# Patient Record
Sex: FEMALE | Race: WHITE | NOT HISPANIC OR LATINO | Employment: OTHER | URBAN - METROPOLITAN AREA
[De-identification: names, ages, dates, MRNs, and addresses within clinical notes are randomized per-mention and may not be internally consistent; named-entity substitution may affect disease eponyms.]

---

## 2021-11-18 ENCOUNTER — OFFICE VISIT (OUTPATIENT)
Dept: URGENT CARE | Facility: PHYSICIAN GROUP | Age: 71
End: 2021-11-18
Payer: COMMERCIAL

## 2021-11-18 VITALS
TEMPERATURE: 97.9 F | BODY MASS INDEX: 28.93 KG/M2 | HEART RATE: 116 BPM | DIASTOLIC BLOOD PRESSURE: 72 MMHG | RESPIRATION RATE: 20 BRPM | HEIGHT: 66 IN | WEIGHT: 180 LBS | SYSTOLIC BLOOD PRESSURE: 142 MMHG | OXYGEN SATURATION: 97 %

## 2021-11-18 DIAGNOSIS — T78.40XA ALLERGIC REACTION, INITIAL ENCOUNTER: ICD-10-CM

## 2021-11-18 DIAGNOSIS — R11.0 NAUSEA: ICD-10-CM

## 2021-11-18 PROCEDURE — 99203 OFFICE O/P NEW LOW 30 MIN: CPT | Performed by: NURSE PRACTITIONER

## 2021-11-18 RX ORDER — PREDNISONE 20 MG/1
TABLET ORAL
Qty: 6 TABLET | Refills: 0 | Status: SHIPPED | OUTPATIENT
Start: 2021-11-18

## 2021-11-18 RX ORDER — DEXAMETHASONE SODIUM PHOSPHATE 10 MG/ML
10 INJECTION INTRAMUSCULAR; INTRAVENOUS ONCE
Status: COMPLETED | OUTPATIENT
Start: 2021-11-18 | End: 2021-11-18

## 2021-11-18 RX ORDER — ONDANSETRON 4 MG/1
4 TABLET, ORALLY DISINTEGRATING ORAL EVERY 8 HOURS PRN
Qty: 20 TABLET | Refills: 0 | Status: SHIPPED | OUTPATIENT
Start: 2021-11-18

## 2021-11-18 RX ORDER — ONDANSETRON 4 MG/1
4 TABLET, ORALLY DISINTEGRATING ORAL ONCE
Status: COMPLETED | OUTPATIENT
Start: 2021-11-18 | End: 2021-11-18

## 2021-11-18 RX ADMIN — DEXAMETHASONE SODIUM PHOSPHATE 10 MG: 10 INJECTION INTRAMUSCULAR; INTRAVENOUS at 16:41

## 2021-11-18 RX ADMIN — ONDANSETRON 4 MG: 4 TABLET, ORALLY DISINTEGRATING ORAL at 16:41

## 2021-11-18 ASSESSMENT — ENCOUNTER SYMPTOMS
COUGH: 0
CONSTIPATION: 0
EYE REDNESS: 0
VOMITING: 0
ABDOMINAL PAIN: 0
FEVER: 0
HEADACHES: 0
SHORTNESS OF BREATH: 0
SORE THROAT: 0
NAUSEA: 1
WHEEZING: 0
DIZZINESS: 0

## 2021-11-18 ASSESSMENT — LIFESTYLE VARIABLES: SUBSTANCE_ABUSE: 0

## 2021-11-19 NOTE — PROGRESS NOTES
Migdalia Aguilar is a 70 y.o. female who presents for Rash (full body rash; Pt noticed it- 1x day ), Diarrhea (2 episodes last 24 hr), and Emesis (3 epsiodes last 24 hr)      HPI this new problem.  Migdalia is a 70-year-old female who is brought into urgent care by her daughter-in-law for complaints of rash all over her body.  Is a very itchy rash.  Rash started 2 days ago.  It started on her thighs and then spread all over her body.  Yesterday caused her tongue to swell.  Her daughter gave her prednisone to take.  She took prednisone tablet last night and 1 again this morning.  That alleviated the tongue swelling but the rashes persisted.  She has also been taking some Benadryl to help with the itch.  The Benadryl has helped her to sleep at night.  2 days ago she experienced 2 episodes of diarrhea as well as vomited 3 times.  She feels better right now.  She does have some mild nausea and is not hungry.  She denies shortness of breath, chest pain, back pain.  No other aggravating or alleviating factors.  She denies any use of any new medications, foods, lotions, shampoos, detergents.    Review of Systems   Constitutional: Negative for fever and malaise/fatigue.   HENT: Negative for sore throat.    Eyes: Negative for redness.   Respiratory: Negative for cough, shortness of breath and wheezing.    Cardiovascular: Negative for chest pain.   Gastrointestinal: Positive for nausea. Negative for abdominal pain, constipation and vomiting.   Genitourinary: Negative for dysuria and urgency.   Skin: Positive for itching and rash.   Neurological: Negative for dizziness and headaches.   Endo/Heme/Allergies: Negative for environmental allergies.   Psychiatric/Behavioral: Negative for substance abuse.       Allergies:       Allergies   Allergen Reactions   • Sulfa Drugs Unspecified     Pt was given to as a child; runs fever.        PMSFS Hx:  History reviewed. No pertinent past medical history.  History reviewed. No pertinent  "surgical history.  History reviewed. No pertinent family history.  Social History     Tobacco Use   • Smoking status: Not on file   • Smokeless tobacco: Not on file   Substance Use Topics   • Alcohol use: Not on file       Problems:   There is no problem list on file for this patient.      Medications:   No current outpatient medications on file prior to visit.     No current facility-administered medications on file prior to visit.          Objective:     /72 (BP Location: Left arm, Patient Position: Sitting, BP Cuff Size: Adult)   Pulse (!) 116   Temp 36.6 °C (97.9 °F) (Temporal)   Resp 20   Ht 1.676 m (5' 6\")   Wt 81.6 kg (180 lb)   SpO2 97%   BMI 29.05 kg/m²     Physical Exam  Vitals and nursing note reviewed.   Constitutional:       Appearance: She is well-developed.   HENT:      Head: Normocephalic.      Mouth/Throat:      Lips: Pink.      Mouth: Mucous membranes are moist.      Pharynx: Uvula midline. No pharyngeal swelling, posterior oropharyngeal erythema or uvula swelling.   Cardiovascular:      Rate and Rhythm: Normal rate and regular rhythm.      Pulses: Normal pulses.      Heart sounds: Normal heart sounds.   Pulmonary:      Effort: Pulmonary effort is normal. No accessory muscle usage.      Breath sounds: Normal breath sounds. No wheezing.   Musculoskeletal:      Cervical back: Full passive range of motion without pain, normal range of motion and neck supple.   Skin:     General: Skin is warm and dry.      Capillary Refill: Capillary refill takes less than 2 seconds.      Findings: Erythema (Diffuse all over body) and rash present.   Neurological:      General: No focal deficit present.      Mental Status: She is alert and oriented to person, place, and time.      Cranial Nerves: Cranial nerves are intact.      Sensory: Sensation is intact.      Motor: Motor function is intact.      Coordination: Coordination is intact.      Gait: Gait is intact.   Psychiatric:         Mood and Affect: " Mood normal.         Behavior: Behavior normal.         Thought Content: Thought content normal.     Decadron and Zofran given in urgent care tonight.    Assessment /Associated Orders:      1. Allergic reaction, initial encounter  dexamethasone (DECADRON) injection (check route below) 10 mg    predniSONE (DELTASONE) 20 MG Tab   2. Nausea  ondansetron (ZOFRAN ODT) dispertab 4 mg    ondansetron (ZOFRAN ODT) 4 MG TABLET DISPERSIBLE       Medical Decision Making:    Pt is clinically stable at today's acute urgent care visit.  No acute distress noted. Appropriate for outpatient management at this time.   Acute problem today with uncertain prognosis.   Educated in proper administration of medication(s) ordered today including safety, possible SE, risks, benefits, rationale and alternatives to therapy.   Start prednisone tomorrow morning  Okay to take 25 mg of Benadryl at night.  Educated in safety precautions while taking it.  It should be used sparingly.  Advised that it can make her sleepy and increased fall risk.  She verbalizes understanding.  Recommended over-the-counter Claritin, Allegra or Zyrtec to be taken during the day  Trial of over-the-counter Pepcid AC  Keep well-hydrated  Consider all sources of possible allergens.  Foods, chemicals, detergents, lotions, etc...        Advised to follow-up with the primary care provider for recheck, reevaluation, and consideration of further management if necessary.   Discussed management options (risks,benefits, and alternatives to treatment). Expressed understanding and the treatment plan was agreed upon. Questions were encouraged and answered   Return to urgent care prn if new or worsening sx or if there is no improvement in condition prn.    Educated in Red flags and indications to immediately call 911 or present to the Emergency Department.     I personally reviewed prior external notes and test results pertinent to today's visit.  I have independently reviewed and  interpreted all diagnostics ordered during this urgent care acute visit.   Time spent evaluating this patient was at least 30 minutes and includes preparing for visit, counseling/education, exam and evaluation, obtaining history, independent interpretation, ordering lab/test/procedures,medication management and documentation.Time does not include separately billable procedures noted .

## 2025-07-01 ENCOUNTER — APPOINTMENT (OUTPATIENT)
Dept: RADIOLOGY | Facility: MEDICAL CENTER | Age: 75
DRG: 020 | End: 2025-07-01
Attending: EMERGENCY MEDICINE
Payer: COMMERCIAL

## 2025-07-01 ENCOUNTER — HOSPITAL ENCOUNTER (OUTPATIENT)
Dept: RADIOLOGY | Facility: MEDICAL CENTER | Age: 75
End: 2025-07-01

## 2025-07-01 ENCOUNTER — HOSPITAL ENCOUNTER (INPATIENT)
Facility: MEDICAL CENTER | Age: 75
LOS: 3 days | DRG: 020 | End: 2025-07-04
Attending: EMERGENCY MEDICINE | Admitting: EMERGENCY MEDICINE
Payer: COMMERCIAL

## 2025-07-01 DIAGNOSIS — I60.9 SAH (SUBARACHNOID HEMORRHAGE) (HCC): ICD-10-CM

## 2025-07-01 DIAGNOSIS — I61.5 INTRAVENTRICULAR HEMORRHAGE (HCC): Primary | ICD-10-CM

## 2025-07-01 DIAGNOSIS — Z71.89 ADVANCED CARE PLANNING/COUNSELING DISCUSSION: ICD-10-CM

## 2025-07-01 PROBLEM — J96.00 ACUTE RESPIRATORY FAILURE (HCC): Status: ACTIVE | Noted: 2025-07-01

## 2025-07-01 PROBLEM — I46.9 CARDIAC ARREST (HCC): Status: ACTIVE | Noted: 2025-07-01

## 2025-07-01 PROBLEM — R79.89 ELEVATED LFTS: Status: ACTIVE | Noted: 2025-07-01

## 2025-07-01 PROBLEM — D72.829 LEUKOCYTOSIS: Status: ACTIVE | Noted: 2025-07-01

## 2025-07-01 LAB
ABO GROUP BLD: NORMAL
ALBUMIN SERPL BCP-MCNC: 3.3 G/DL (ref 3.2–4.9)
ALBUMIN/GLOB SERPL: 1.1 G/DL
ALP SERPL-CCNC: 82 U/L (ref 30–99)
ALT SERPL-CCNC: 310 U/L (ref 2–50)
ANION GAP SERPL CALC-SCNC: 15 MMOL/L (ref 7–16)
AST SERPL-CCNC: 305 U/L (ref 12–45)
BASE EXCESS BLDA CALC-SCNC: -6 MMOL/L (ref -4–3)
BASE EXCESS BLDV CALC-SCNC: -4 MMOL/L (ref -2–3)
BASOPHILS # BLD AUTO: 0 % (ref 0–1.8)
BASOPHILS # BLD: 0 K/UL (ref 0–0.12)
BILIRUB SERPL-MCNC: 0.4 MG/DL (ref 0.1–1.5)
BLD GP AB SCN SERPL QL: NORMAL
BODY TEMPERATURE: 36.2 CENTIGRADE
BODY TEMPERATURE: ABNORMAL DEGREES
BREATHS SETTING VENT: 22
BUN SERPL-MCNC: 32 MG/DL (ref 8–22)
BURR CELLS BLD QL SMEAR: NORMAL
CALCIUM ALBUM COR SERPL-MCNC: 9 MG/DL (ref 8.5–10.5)
CALCIUM SERPL-MCNC: 8.4 MG/DL (ref 8.5–10.5)
CHLORIDE SERPL-SCNC: 105 MMOL/L (ref 96–112)
CHOLEST SERPL-MCNC: 175 MG/DL (ref 100–199)
CO2 BLDA-SCNC: 21 MMOL/L (ref 32–48)
CO2 SERPL-SCNC: 18 MMOL/L (ref 20–33)
CREAT SERPL-MCNC: 0.89 MG/DL (ref 0.5–1.4)
DELSYS IDSYS: ABNORMAL
EOSINOPHIL # BLD AUTO: 0 K/UL (ref 0–0.51)
EOSINOPHIL NFR BLD: 0 % (ref 0–6.9)
ERYTHROCYTE [DISTWIDTH] IN BLOOD BY AUTOMATED COUNT: 47.5 FL (ref 35.9–50)
GFR SERPLBLD CREATININE-BSD FMLA CKD-EPI: 67 ML/MIN/1.73 M 2
GLOBULIN SER CALC-MCNC: 3.1 G/DL (ref 1.9–3.5)
GLUCOSE SERPL-MCNC: 189 MG/DL (ref 65–99)
HCO3 BLDA-SCNC: 20.1 MMOL/L (ref 21–28)
HCO3 BLDV-SCNC: 20 MMOL/L (ref 22–29)
HCT VFR BLD AUTO: 36.6 % (ref 37–47)
HDLC SERPL-MCNC: 53 MG/DL
HGB BLD-MCNC: 12.1 G/DL (ref 12–16)
HOROWITZ INDEX BLDA+IHG-RTO: 362 MM[HG]
LACTATE BLD-SCNC: 2.2 MMOL/L (ref 0.5–2)
LACTATE SERPL-SCNC: 2.3 MMOL/L (ref 0.5–2)
LDLC SERPL CALC-MCNC: 84 MG/DL
LYMPHOCYTES # BLD AUTO: 0.81 K/UL (ref 1–4.8)
LYMPHOCYTES NFR BLD: 3.4 % (ref 22–41)
MANUAL DIFF BLD: NORMAL
MCH RBC QN AUTO: 27.7 PG (ref 27–33)
MCHC RBC AUTO-ENTMCNC: 33.1 G/DL (ref 32.2–35.5)
MCV RBC AUTO: 83.8 FL (ref 81.4–97.8)
MODE IMODE: ABNORMAL
MONOCYTES # BLD AUTO: 2.3 K/UL (ref 0–0.85)
MONOCYTES NFR BLD AUTO: 9.5 % (ref 0–13.4)
MORPHOLOGY BLD-IMP: NORMAL
NEUTROPHILS # BLD AUTO: 20.64 K/UL (ref 1.82–7.42)
NEUTROPHILS NFR BLD: 87.1 % (ref 44–72)
NRBC # BLD AUTO: 0 K/UL
NRBC BLD-RTO: 0 /100 WBC (ref 0–0.2)
NT-PROBNP SERPL IA-MCNC: 1697 PG/ML (ref 0–125)
O2/TOTAL GAS SETTING VFR VENT: 50 %
PCO2 BLDA: 40.8 MMHG (ref 32–48)
PCO2 BLDV: 32.4 MMHG (ref 38–54)
PEEP END EXPIRATORY PRESSURE IPEEP: 8 CMH20
PH BLDA: 7.3 [PH] (ref 7.35–7.45)
PH BLDV: 7.39 [PH] (ref 7.31–7.45)
PLATELET # BLD AUTO: 330 K/UL (ref 164–446)
PLATELET BLD QL SMEAR: NORMAL
PMV BLD AUTO: 10.2 FL (ref 9–12.9)
PO2 BLDA: 181 MMHG (ref 83–108)
PO2 BLDV: 56.2 MMHG (ref 23–48)
POIKILOCYTOSIS BLD QL SMEAR: NORMAL
POTASSIUM SERPL-SCNC: 3.3 MMOL/L (ref 3.6–5.5)
PROT SERPL-MCNC: 6.4 G/DL (ref 6–8.2)
RBC # BLD AUTO: 4.37 M/UL (ref 4.2–5.4)
RBC BLD AUTO: PRESENT
RH BLD: NORMAL
SAO2 % BLDA: 99 % (ref 93–99)
SAO2 % BLDV: 82 % (ref 60–85)
SODIUM SERPL-SCNC: 138 MMOL/L (ref 135–145)
SPECIMEN DRAWN FROM PATIENT: ABNORMAL
TIDAL VOLUME IVT: 340 ML
TRIGL SERPL-MCNC: 189 MG/DL (ref 0–149)
TROPONIN T SERPL-MCNC: 139 NG/L (ref 6–19)
WBC # BLD AUTO: 23.7 K/UL (ref 4.8–10.8)

## 2025-07-01 PROCEDURE — 70496 CT ANGIOGRAPHY HEAD: CPT

## 2025-07-01 PROCEDURE — 96365 THER/PROPH/DIAG IV INF INIT: CPT

## 2025-07-01 PROCEDURE — 82803 BLOOD GASES ANY COMBINATION: CPT | Mod: 91

## 2025-07-01 PROCEDURE — 96368 THER/DIAG CONCURRENT INF: CPT

## 2025-07-01 PROCEDURE — 770022 HCHG ROOM/CARE - ICU (200)

## 2025-07-01 PROCEDURE — 61107 TDH PNXR IMPLT VENTR CATH: CPT

## 2025-07-01 PROCEDURE — 85007 BL SMEAR W/DIFF WBC COUNT: CPT

## 2025-07-01 PROCEDURE — 70498 CT ANGIOGRAPHY NECK: CPT

## 2025-07-01 PROCEDURE — 93005 ELECTROCARDIOGRAM TRACING: CPT | Mod: TC | Performed by: EMERGENCY MEDICINE

## 2025-07-01 PROCEDURE — 86850 RBC ANTIBODY SCREEN: CPT

## 2025-07-01 PROCEDURE — 99291 CRITICAL CARE FIRST HOUR: CPT

## 2025-07-01 PROCEDURE — 99292 CRITICAL CARE ADDL 30 MIN: CPT | Performed by: EMERGENCY MEDICINE

## 2025-07-01 PROCEDURE — 700101 HCHG RX REV CODE 250: Performed by: EMERGENCY MEDICINE

## 2025-07-01 PROCEDURE — 700105 HCHG RX REV CODE 258: Performed by: EMERGENCY MEDICINE

## 2025-07-01 PROCEDURE — 700111 HCHG RX REV CODE 636 W/ 250 OVERRIDE (IP): Mod: JZ | Performed by: NEUROLOGICAL SURGERY

## 2025-07-01 PROCEDURE — 36415 COLL VENOUS BLD VENIPUNCTURE: CPT

## 2025-07-01 PROCEDURE — 85384 FIBRINOGEN ACTIVITY: CPT

## 2025-07-01 PROCEDURE — 96376 TX/PRO/DX INJ SAME DRUG ADON: CPT

## 2025-07-01 PROCEDURE — 86901 BLOOD TYPING SEROLOGIC RH(D): CPT

## 2025-07-01 PROCEDURE — 96366 THER/PROPH/DIAG IV INF ADDON: CPT

## 2025-07-01 PROCEDURE — 99222 1ST HOSP IP/OBS MODERATE 55: CPT | Performed by: PSYCHIATRY & NEUROLOGY

## 2025-07-01 PROCEDURE — 36600 WITHDRAWAL OF ARTERIAL BLOOD: CPT

## 2025-07-01 PROCEDURE — 85027 COMPLETE CBC AUTOMATED: CPT

## 2025-07-01 PROCEDURE — 83605 ASSAY OF LACTIC ACID: CPT

## 2025-07-01 PROCEDURE — 85347 COAGULATION TIME ACTIVATED: CPT

## 2025-07-01 PROCEDURE — 83880 ASSAY OF NATRIURETIC PEPTIDE: CPT

## 2025-07-01 PROCEDURE — 700117 HCHG RX CONTRAST REV CODE 255: Performed by: EMERGENCY MEDICINE

## 2025-07-01 PROCEDURE — 009630Z DRAINAGE OF CEREBRAL VENTRICLE WITH DRAINAGE DEVICE, PERCUTANEOUS APPROACH: ICD-10-PCS | Performed by: NEUROLOGICAL SURGERY

## 2025-07-01 PROCEDURE — 86900 BLOOD TYPING SEROLOGIC ABO: CPT

## 2025-07-01 PROCEDURE — 84484 ASSAY OF TROPONIN QUANT: CPT

## 2025-07-01 PROCEDURE — 99291 CRITICAL CARE FIRST HOUR: CPT | Performed by: EMERGENCY MEDICINE

## 2025-07-01 PROCEDURE — 700111 HCHG RX REV CODE 636 W/ 250 OVERRIDE (IP): Mod: JZ

## 2025-07-01 PROCEDURE — 80061 LIPID PANEL: CPT

## 2025-07-01 PROCEDURE — 71045 X-RAY EXAM CHEST 1 VIEW: CPT

## 2025-07-01 PROCEDURE — 85576 BLOOD PLATELET AGGREGATION: CPT | Mod: 91

## 2025-07-01 PROCEDURE — 80053 COMPREHEN METABOLIC PANEL: CPT

## 2025-07-01 PROCEDURE — 94002 VENT MGMT INPAT INIT DAY: CPT

## 2025-07-01 PROCEDURE — 96375 TX/PRO/DX INJ NEW DRUG ADDON: CPT

## 2025-07-01 RX ORDER — ONDANSETRON 2 MG/ML
4 INJECTION INTRAMUSCULAR; INTRAVENOUS EVERY 4 HOURS PRN
Status: DISCONTINUED | OUTPATIENT
Start: 2025-07-01 | End: 2025-07-02

## 2025-07-01 RX ORDER — ACETAMINOPHEN 325 MG/1
650 TABLET ORAL EVERY 6 HOURS PRN
Status: DISCONTINUED | OUTPATIENT
Start: 2025-07-01 | End: 2025-07-02

## 2025-07-01 RX ORDER — ACETAMINOPHEN 500 MG
1000 TABLET ORAL EVERY 6 HOURS
Status: DISCONTINUED | OUTPATIENT
Start: 2025-07-02 | End: 2025-07-02

## 2025-07-01 RX ORDER — NIMODIPINE 30 MG/1
60 CAPSULE, LIQUID FILLED ORAL EVERY 4 HOURS
Status: DISCONTINUED | OUTPATIENT
Start: 2025-07-01 | End: 2025-07-01

## 2025-07-01 RX ORDER — ACETAMINOPHEN 650 MG/1
1000 SUPPOSITORY RECTAL EVERY 6 HOURS
Status: DISCONTINUED | OUTPATIENT
Start: 2025-07-02 | End: 2025-07-02

## 2025-07-01 RX ORDER — LABETALOL HYDROCHLORIDE 5 MG/ML
20 INJECTION, SOLUTION INTRAVENOUS ONCE
Status: DISCONTINUED | OUTPATIENT
Start: 2025-07-01 | End: 2025-07-02

## 2025-07-01 RX ORDER — AMOXICILLIN 250 MG
2 CAPSULE ORAL EVERY EVENING
Status: DISCONTINUED | OUTPATIENT
Start: 2025-07-02 | End: 2025-07-02

## 2025-07-01 RX ORDER — POLYETHYLENE GLYCOL 3350 17 G/17G
1 POWDER, FOR SOLUTION ORAL
Status: DISCONTINUED | OUTPATIENT
Start: 2025-07-01 | End: 2025-07-02

## 2025-07-01 RX ORDER — SODIUM CHLORIDE 9 MG/ML
INJECTION, SOLUTION INTRAVENOUS CONTINUOUS
Status: DISCONTINUED | OUTPATIENT
Start: 2025-07-01 | End: 2025-07-02

## 2025-07-01 RX ORDER — HYDROMORPHONE HYDROCHLORIDE 1 MG/ML
0.5 INJECTION, SOLUTION INTRAMUSCULAR; INTRAVENOUS; SUBCUTANEOUS
Status: DISCONTINUED | OUTPATIENT
Start: 2025-07-01 | End: 2025-07-02

## 2025-07-01 RX ORDER — IPRATROPIUM BROMIDE AND ALBUTEROL SULFATE 2.5; .5 MG/3ML; MG/3ML
3 SOLUTION RESPIRATORY (INHALATION)
Status: DISCONTINUED | OUTPATIENT
Start: 2025-07-01 | End: 2025-07-02

## 2025-07-01 RX ORDER — HYDROMORPHONE HYDROCHLORIDE 1 MG/ML
1 INJECTION, SOLUTION INTRAMUSCULAR; INTRAVENOUS; SUBCUTANEOUS
Status: DISCONTINUED | OUTPATIENT
Start: 2025-07-01 | End: 2025-07-02

## 2025-07-01 RX ORDER — FAMOTIDINE 20 MG/1
20 TABLET, FILM COATED ORAL EVERY 12 HOURS
Status: DISCONTINUED | OUTPATIENT
Start: 2025-07-01 | End: 2025-07-02

## 2025-07-01 RX ORDER — ONDANSETRON 4 MG/1
4 TABLET, ORALLY DISINTEGRATING ORAL EVERY 4 HOURS PRN
Status: DISCONTINUED | OUTPATIENT
Start: 2025-07-01 | End: 2025-07-02

## 2025-07-01 RX ADMIN — FENTANYL CITRATE 50 MCG: 50 INJECTION, SOLUTION INTRAMUSCULAR; INTRAVENOUS at 22:32

## 2025-07-01 RX ADMIN — IOHEXOL 80 ML: 350 INJECTION, SOLUTION INTRAVENOUS at 23:10

## 2025-07-01 RX ADMIN — FENTANYL CITRATE 50 MCG: 50 INJECTION, SOLUTION INTRAMUSCULAR; INTRAVENOUS at 22:30

## 2025-07-01 RX ADMIN — PROPOFOL 5 MCG/KG/MIN: 10 INJECTION, EMULSION INTRAVENOUS at 23:05

## 2025-07-01 RX ADMIN — SODIUM CHLORIDE 10 MG/HR: 9 INJECTION, SOLUTION INTRAVENOUS at 22:58

## 2025-07-01 RX ADMIN — SODIUM CHLORIDE 10 MG/HR: 9 INJECTION, SOLUTION INTRAVENOUS at 22:22

## 2025-07-01 NOTE — Clinical Note
Closure device placed in the right femoral artery.   TERUMO  Angio-Seal VIP  Vascular Closure Device  REF [id number]  LOT [id number]  EXP 2026-02-27

## 2025-07-02 ENCOUNTER — APPOINTMENT (OUTPATIENT)
Dept: RADIOLOGY | Facility: MEDICAL CENTER | Age: 75
DRG: 020 | End: 2025-07-02
Attending: INTERNAL MEDICINE
Payer: COMMERCIAL

## 2025-07-02 ENCOUNTER — ANESTHESIA EVENT (OUTPATIENT)
Dept: RADIOLOGY | Facility: MEDICAL CENTER | Age: 75
DRG: 020 | End: 2025-07-02
Payer: COMMERCIAL

## 2025-07-02 ENCOUNTER — APPOINTMENT (OUTPATIENT)
Dept: CARDIOLOGY | Facility: MEDICAL CENTER | Age: 75
DRG: 020 | End: 2025-07-02
Attending: INTERNAL MEDICINE
Payer: COMMERCIAL

## 2025-07-02 ENCOUNTER — ANESTHESIA (OUTPATIENT)
Dept: RADIOLOGY | Facility: MEDICAL CENTER | Age: 75
DRG: 020 | End: 2025-07-02
Payer: COMMERCIAL

## 2025-07-02 ENCOUNTER — APPOINTMENT (OUTPATIENT)
Dept: RADIOLOGY | Facility: MEDICAL CENTER | Age: 75
DRG: 020 | End: 2025-07-02
Attending: NEUROLOGICAL SURGERY
Payer: COMMERCIAL

## 2025-07-02 ENCOUNTER — HOSPITAL ENCOUNTER (OUTPATIENT)
Dept: RADIOLOGY | Facility: MEDICAL CENTER | Age: 75
End: 2025-07-02

## 2025-07-02 ENCOUNTER — APPOINTMENT (OUTPATIENT)
Dept: RADIOLOGY | Facility: MEDICAL CENTER | Age: 75
DRG: 020 | End: 2025-07-02
Attending: EMERGENCY MEDICINE
Payer: COMMERCIAL

## 2025-07-02 VITALS
DIASTOLIC BLOOD PRESSURE: 58 MMHG | RESPIRATION RATE: 22 BRPM | HEIGHT: 66 IN | OXYGEN SATURATION: 98 % | WEIGHT: 180 LBS | TEMPERATURE: 97.2 F | SYSTOLIC BLOOD PRESSURE: 126 MMHG | BODY MASS INDEX: 28.93 KG/M2 | HEART RATE: 66 BPM

## 2025-07-02 PROBLEM — J96.01 ACUTE RESPIRATORY FAILURE WITH HYPOXIA (HCC): Status: ACTIVE | Noted: 2025-07-01

## 2025-07-02 PROBLEM — Z71.89 ADVANCED CARE PLANNING/COUNSELING DISCUSSION: Status: ACTIVE | Noted: 2025-07-02

## 2025-07-02 LAB
ABO + RH BLD: NORMAL
ALBUMIN SERPL BCP-MCNC: 3.3 G/DL (ref 3.2–4.9)
ALBUMIN SERPL BCP-MCNC: 3.4 G/DL (ref 3.2–4.9)
ALBUMIN/GLOB SERPL: 1 G/DL
ALBUMIN/GLOB SERPL: 1.2 G/DL
ALP SERPL-CCNC: 77 U/L (ref 30–99)
ALP SERPL-CCNC: 81 U/L (ref 30–99)
ALT SERPL-CCNC: 265 U/L (ref 2–50)
ALT SERPL-CCNC: 291 U/L (ref 2–50)
ANION GAP SERPL CALC-SCNC: 13 MMOL/L (ref 7–16)
ANION GAP SERPL CALC-SCNC: 15 MMOL/L (ref 7–16)
ANISOCYTOSIS BLD QL SMEAR: ABNORMAL
APTT PPP: 23.7 SEC (ref 24.7–36)
ARTERIAL PATENCY WRIST A: ABNORMAL
AST SERPL-CCNC: 191 U/L (ref 12–45)
AST SERPL-CCNC: 252 U/L (ref 12–45)
BASE EXCESS BLDA CALC-SCNC: -7 MMOL/L (ref -4–3)
BASOPHILS # BLD AUTO: 0 % (ref 0–1.8)
BASOPHILS # BLD AUTO: 0 % (ref 0–1.8)
BASOPHILS # BLD: 0 K/UL (ref 0–0.12)
BASOPHILS # BLD: 0 K/UL (ref 0–0.12)
BILIRUB SERPL-MCNC: 0.4 MG/DL (ref 0.1–1.5)
BILIRUB SERPL-MCNC: 0.4 MG/DL (ref 0.1–1.5)
BODY TEMPERATURE: ABNORMAL DEGREES
BREATHS SETTING VENT: 22
BUN SERPL-MCNC: 26 MG/DL (ref 8–22)
BUN SERPL-MCNC: 30 MG/DL (ref 8–22)
BURR CELLS BLD QL SMEAR: NORMAL
BURR CELLS BLD QL SMEAR: NORMAL
CALCIUM ALBUM COR SERPL-MCNC: 8.7 MG/DL (ref 8.5–10.5)
CALCIUM ALBUM COR SERPL-MCNC: 8.7 MG/DL (ref 8.5–10.5)
CALCIUM SERPL-MCNC: 8.1 MG/DL (ref 8.5–10.5)
CALCIUM SERPL-MCNC: 8.2 MG/DL (ref 8.5–10.5)
CFT BLD TEG: 3.8 MIN (ref 4.6–9.1)
CFT P HPASE BLD TEG: 3.8 MIN (ref 4.3–8.3)
CHLORIDE SERPL-SCNC: 106 MMOL/L (ref 96–112)
CHLORIDE SERPL-SCNC: 108 MMOL/L (ref 96–112)
CLOT ANGLE BLD TEG: 79.4 DEGREES (ref 63–78)
CLOT LYSIS 30M P MA LENFR BLD TEG: 0 % (ref 0–2.6)
CO2 BLDA-SCNC: 20 MMOL/L (ref 32–48)
CO2 SERPL-SCNC: 18 MMOL/L (ref 20–33)
CO2 SERPL-SCNC: 18 MMOL/L (ref 20–33)
CREAT SERPL-MCNC: 0.76 MG/DL (ref 0.5–1.4)
CREAT SERPL-MCNC: 0.79 MG/DL (ref 0.5–1.4)
CT.EXTRINSIC BLD ROTEM: 0.8 MIN (ref 0.8–2.1)
DELSYS IDSYS: ABNORMAL
EKG IMPRESSION: NORMAL
END TIDAL CARBON DIOXIDE IECO2: 31 MMHG
EOSINOPHIL # BLD AUTO: 0 K/UL (ref 0–0.51)
EOSINOPHIL # BLD AUTO: 0 K/UL (ref 0–0.51)
EOSINOPHIL NFR BLD: 0 % (ref 0–6.9)
EOSINOPHIL NFR BLD: 0 % (ref 0–6.9)
ERYTHROCYTE [DISTWIDTH] IN BLOOD BY AUTOMATED COUNT: 47.8 FL (ref 35.9–50)
ERYTHROCYTE [DISTWIDTH] IN BLOOD BY AUTOMATED COUNT: 48.6 FL (ref 35.9–50)
GFR SERPLBLD CREATININE-BSD FMLA CKD-EPI: 77 ML/MIN/1.73 M 2
GFR SERPLBLD CREATININE-BSD FMLA CKD-EPI: 81 ML/MIN/1.73 M 2
GLOBULIN SER CALC-MCNC: 2.9 G/DL (ref 1.9–3.5)
GLOBULIN SER CALC-MCNC: 3.2 G/DL (ref 1.9–3.5)
GLUCOSE BLD STRIP.AUTO-MCNC: 126 MG/DL (ref 65–99)
GLUCOSE BLD STRIP.AUTO-MCNC: 177 MG/DL (ref 65–99)
GLUCOSE SERPL-MCNC: 188 MG/DL (ref 65–99)
GLUCOSE SERPL-MCNC: 252 MG/DL (ref 65–99)
HCO3 BLDA-SCNC: 18.6 MMOL/L (ref 21–28)
HCT VFR BLD AUTO: 35.4 % (ref 37–47)
HCT VFR BLD AUTO: 36.2 % (ref 37–47)
HGB BLD-MCNC: 11.7 G/DL (ref 12–16)
HGB BLD-MCNC: 11.8 G/DL (ref 12–16)
HOROWITZ INDEX BLDA+IHG-RTO: 364 MM[HG]
INR PPP: 1.23 (ref 0.87–1.13)
LACTATE BLD-SCNC: 4.4 MMOL/L (ref 0.5–2)
LV EJECT FRACT  99904: 75
LV EJECT FRACT MOD 2C 99903: 70.44
LV EJECT FRACT MOD 4C 99902: 71.88
LV EJECT FRACT MOD BP 99901: 68.58
LYMPHOCYTES # BLD AUTO: 0.23 K/UL (ref 1–4.8)
LYMPHOCYTES # BLD AUTO: 0.89 K/UL (ref 1–4.8)
LYMPHOCYTES NFR BLD: 0.9 % (ref 22–41)
LYMPHOCYTES NFR BLD: 4.3 % (ref 22–41)
MAGNESIUM SERPL-MCNC: 1.9 MG/DL (ref 1.5–2.5)
MANUAL DIFF BLD: NORMAL
MANUAL DIFF BLD: NORMAL
MCF BLD TEG: 68.6 MM (ref 52–69)
MCF.PLATELET INHIB BLD ROTEM: 34.7 MM (ref 15–32)
MCH RBC QN AUTO: 27.7 PG (ref 27–33)
MCH RBC QN AUTO: 27.9 PG (ref 27–33)
MCHC RBC AUTO-ENTMCNC: 32.6 G/DL (ref 32.2–35.5)
MCHC RBC AUTO-ENTMCNC: 33.1 G/DL (ref 32.2–35.5)
MCV RBC AUTO: 83.9 FL (ref 81.4–97.8)
MCV RBC AUTO: 85.6 FL (ref 81.4–97.8)
MICROCYTES BLD QL SMEAR: ABNORMAL
MODE IMODE: ABNORMAL
MONOCYTES # BLD AUTO: 0.4 K/UL (ref 0–0.85)
MONOCYTES # BLD AUTO: 1.3 K/UL (ref 0–0.85)
MONOCYTES NFR BLD AUTO: 1.7 % (ref 0–13.4)
MONOCYTES NFR BLD AUTO: 5.1 % (ref 0–13.4)
MORPHOLOGY BLD-IMP: NORMAL
MORPHOLOGY BLD-IMP: NORMAL
NEUTROPHILS # BLD AUTO: 19.55 K/UL (ref 1.82–7.42)
NEUTROPHILS # BLD AUTO: 24.44 K/UL (ref 1.82–7.42)
NEUTROPHILS NFR BLD: 93.1 % (ref 44–72)
NEUTROPHILS NFR BLD: 94 % (ref 44–72)
NEUTS BAND NFR BLD MANUAL: 0.9 % (ref 0–10)
NRBC # BLD AUTO: 0 K/UL
NRBC # BLD AUTO: 0 K/UL
NRBC BLD-RTO: 0 /100 WBC (ref 0–0.2)
NRBC BLD-RTO: 0 /100 WBC (ref 0–0.2)
O2/TOTAL GAS SETTING VFR VENT: 50 %
OVALOCYTES BLD QL SMEAR: NORMAL
PA AA BLD-ACNC: 42.3 % (ref 0–11)
PA ADP BLD-ACNC: 20 % (ref 0–17)
PCO2 BLDA: 35.2 MMHG (ref 32–48)
PCO2 TEMP ADJ BLDA: 33.7 MMHG (ref 32–48)
PEEP END EXPIRATORY PRESSURE IPEEP: 8 CMH20
PH BLDA: 7.33 [PH] (ref 7.35–7.45)
PH TEMP ADJ BLDA: 7.34 [PH] (ref 7.35–7.45)
PHOSPHATE SERPL-MCNC: 2 MG/DL (ref 2.5–4.5)
PLATELET # BLD AUTO: 317 K/UL (ref 164–446)
PLATELET # BLD AUTO: 319 K/UL (ref 164–446)
PLATELET BLD QL SMEAR: NORMAL
PLATELET BLD QL SMEAR: NORMAL
PMV BLD AUTO: 10 FL (ref 9–12.9)
PMV BLD AUTO: 9.8 FL (ref 9–12.9)
PO2 BLDA: 182 MMHG (ref 83–108)
PO2 TEMP ADJ BLDA: 177 MMHG (ref 83–108)
POIKILOCYTOSIS BLD QL SMEAR: NORMAL
POIKILOCYTOSIS BLD QL SMEAR: NORMAL
POTASSIUM SERPL-SCNC: 2.8 MMOL/L (ref 3.6–5.5)
POTASSIUM SERPL-SCNC: 3.2 MMOL/L (ref 3.6–5.5)
POTASSIUM SERPL-SCNC: 3.2 MMOL/L (ref 3.6–5.5)
PROT SERPL-MCNC: 6.3 G/DL (ref 6–8.2)
PROT SERPL-MCNC: 6.5 G/DL (ref 6–8.2)
PROTHROMBIN TIME: 15.6 SEC (ref 12–14.6)
RBC # BLD AUTO: 4.22 M/UL (ref 4.2–5.4)
RBC # BLD AUTO: 4.23 M/UL (ref 4.2–5.4)
RBC BLD AUTO: PRESENT
RBC BLD AUTO: PRESENT
SAO2 % BLDA: 100 % (ref 93–99)
SODIUM SERPL-SCNC: 139 MMOL/L (ref 135–145)
SODIUM SERPL-SCNC: 139 MMOL/L (ref 135–145)
SODIUM SERPL-SCNC: 141 MMOL/L (ref 135–145)
SPECIMEN DRAWN FROM PATIENT: ABNORMAL
TEG ALGORITHM TGALG: ABNORMAL
TIDAL VOLUME IVT: 340 ML
WBC # BLD AUTO: 20.8 K/UL (ref 4.8–10.8)
WBC # BLD AUTO: 26 K/UL (ref 4.8–10.8)

## 2025-07-02 PROCEDURE — 36415 COLL VENOUS BLD VENIPUNCTURE: CPT

## 2025-07-02 PROCEDURE — 85610 PROTHROMBIN TIME: CPT

## 2025-07-02 PROCEDURE — 700101 HCHG RX REV CODE 250: Performed by: EMERGENCY MEDICINE

## 2025-07-02 PROCEDURE — 36620 INSERTION CATHETER ARTERY: CPT

## 2025-07-02 PROCEDURE — 700102 HCHG RX REV CODE 250 W/ 637 OVERRIDE(OP): Performed by: INTERNAL MEDICINE

## 2025-07-02 PROCEDURE — 36556 INSERT NON-TUNNEL CV CATH: CPT

## 2025-07-02 PROCEDURE — 83605 ASSAY OF LACTIC ACID: CPT

## 2025-07-02 PROCEDURE — 700101 HCHG RX REV CODE 250: Performed by: ANESTHESIOLOGY

## 2025-07-02 PROCEDURE — 61107 TDH PNXR IMPLT VENTR CATH: CPT

## 2025-07-02 PROCEDURE — 700111 HCHG RX REV CODE 636 W/ 250 OVERRIDE (IP): Mod: JZ | Performed by: RADIOLOGY

## 2025-07-02 PROCEDURE — 84295 ASSAY OF SERUM SODIUM: CPT

## 2025-07-02 PROCEDURE — 85730 THROMBOPLASTIN TIME PARTIAL: CPT

## 2025-07-02 PROCEDURE — 02HV33Z INSERTION OF INFUSION DEVICE INTO SUPERIOR VENA CAVA, PERCUTANEOUS APPROACH: ICD-10-PCS | Performed by: EMERGENCY MEDICINE

## 2025-07-02 PROCEDURE — 36224 PLACE CATH CAROTD ART: CPT

## 2025-07-02 PROCEDURE — 700105 HCHG RX REV CODE 258: Performed by: EMERGENCY MEDICINE

## 2025-07-02 PROCEDURE — 94799 UNLISTED PULMONARY SVC/PX: CPT

## 2025-07-02 PROCEDURE — 03HB33Z INSERTION OF INFUSION DEVICE INTO RIGHT RADIAL ARTERY, PERCUTANEOUS APPROACH: ICD-10-PCS | Performed by: INTERNAL MEDICINE

## 2025-07-02 PROCEDURE — 700102 HCHG RX REV CODE 250 W/ 637 OVERRIDE(OP): Performed by: EMERGENCY MEDICINE

## 2025-07-02 PROCEDURE — 76937 US GUIDE VASCULAR ACCESS: CPT

## 2025-07-02 PROCEDURE — 770001 HCHG ROOM/CARE - MED/SURG/GYN PRIV*

## 2025-07-02 PROCEDURE — 700101 HCHG RX REV CODE 250: Performed by: INTERNAL MEDICINE

## 2025-07-02 PROCEDURE — 86900 BLOOD TYPING SEROLOGIC ABO: CPT

## 2025-07-02 PROCEDURE — 75894 X-RAYS TRANSCATH THERAPY: CPT

## 2025-07-02 PROCEDURE — 75898 FOLLOW-UP ANGIOGRAPHY: CPT

## 2025-07-02 PROCEDURE — 700111 HCHG RX REV CODE 636 W/ 250 OVERRIDE (IP): Performed by: NURSE PRACTITIONER

## 2025-07-02 PROCEDURE — 700111 HCHG RX REV CODE 636 W/ 250 OVERRIDE (IP): Mod: JZ | Performed by: EMERGENCY MEDICINE

## 2025-07-02 PROCEDURE — 85027 COMPLETE CBC AUTOMATED: CPT

## 2025-07-02 PROCEDURE — C1876 STENT, NON-COA/NON-COV W/DEL: HCPCS

## 2025-07-02 PROCEDURE — 93306 TTE W/DOPPLER COMPLETE: CPT | Mod: 26 | Performed by: INTERNAL MEDICINE

## 2025-07-02 PROCEDURE — B3181ZZ FLUOROSCOPY OF BILATERAL INTERNAL CAROTID ARTERIES USING LOW OSMOLAR CONTRAST: ICD-10-PCS | Performed by: RADIOLOGY

## 2025-07-02 PROCEDURE — 84100 ASSAY OF PHOSPHORUS: CPT

## 2025-07-02 PROCEDURE — 302022 DRAINAGE MONITORING SYS, VENTR: Performed by: INTERNAL MEDICINE

## 2025-07-02 PROCEDURE — 4A00X4Z MEASUREMENT OF CENTRAL NERVOUS ELECTRICAL ACTIVITY, EXTERNAL APPROACH: ICD-10-PCS

## 2025-07-02 PROCEDURE — 03VG3HZ RESTRICTION OF INTRACRANIAL ARTERY WITH INTRALUMINAL DEVICE, FLOW DIVERTER, PERCUTANEOUS APPROACH: ICD-10-PCS | Performed by: RADIOLOGY

## 2025-07-02 PROCEDURE — 93306 TTE W/DOPPLER COMPLETE: CPT

## 2025-07-02 PROCEDURE — 80053 COMPREHEN METABOLIC PANEL: CPT

## 2025-07-02 PROCEDURE — 36556 INSERT NON-TUNNEL CV CATH: CPT | Performed by: EMERGENCY MEDICINE

## 2025-07-02 PROCEDURE — 85007 BL SMEAR W/DIFF WBC COUNT: CPT

## 2025-07-02 PROCEDURE — 36600 WITHDRAWAL OF ARTERIAL BLOOD: CPT

## 2025-07-02 PROCEDURE — 83735 ASSAY OF MAGNESIUM: CPT

## 2025-07-02 PROCEDURE — 86901 BLOOD TYPING SEROLOGIC RH(D): CPT

## 2025-07-02 PROCEDURE — 94003 VENT MGMT INPAT SUBQ DAY: CPT

## 2025-07-02 PROCEDURE — 700105 HCHG RX REV CODE 258: Performed by: NEUROLOGICAL SURGERY

## 2025-07-02 PROCEDURE — C1751 CATH, INF, PER/CENT/MIDLINE: HCPCS | Performed by: INTERNAL MEDICINE

## 2025-07-02 PROCEDURE — 700105 HCHG RX REV CODE 258: Performed by: INTERNAL MEDICINE

## 2025-07-02 PROCEDURE — 82803 BLOOD GASES ANY COMBINATION: CPT

## 2025-07-02 PROCEDURE — 700105 HCHG RX REV CODE 258: Performed by: ANESTHESIOLOGY

## 2025-07-02 PROCEDURE — 99291 CRITICAL CARE FIRST HOUR: CPT | Mod: 25 | Performed by: INTERNAL MEDICINE

## 2025-07-02 PROCEDURE — 99152 MOD SED SAME PHYS/QHP 5/>YRS: CPT

## 2025-07-02 PROCEDURE — 700117 HCHG RX CONTRAST REV CODE 255: Performed by: RADIOLOGY

## 2025-07-02 PROCEDURE — A9270 NON-COVERED ITEM OR SERVICE: HCPCS | Performed by: EMERGENCY MEDICINE

## 2025-07-02 PROCEDURE — 82962 GLUCOSE BLOOD TEST: CPT | Performed by: INTERNAL MEDICINE

## 2025-07-02 PROCEDURE — 700111 HCHG RX REV CODE 636 W/ 250 OVERRIDE (IP): Mod: JZ | Performed by: ANESTHESIOLOGY

## 2025-07-02 PROCEDURE — 84132 ASSAY OF SERUM POTASSIUM: CPT

## 2025-07-02 PROCEDURE — 700111 HCHG RX REV CODE 636 W/ 250 OVERRIDE (IP): Performed by: NEUROLOGICAL SURGERY

## 2025-07-02 PROCEDURE — 700111 HCHG RX REV CODE 636 W/ 250 OVERRIDE (IP): Performed by: RADIOLOGY

## 2025-07-02 PROCEDURE — 36620 INSERTION CATHETER ARTERY: CPT | Performed by: INTERNAL MEDICINE

## 2025-07-02 PROCEDURE — 700111 HCHG RX REV CODE 636 W/ 250 OVERRIDE (IP): Performed by: INTERNAL MEDICINE

## 2025-07-02 PROCEDURE — 61624 TCAT PERM OCCLS/EMBOLJ CNS: CPT

## 2025-07-02 RX ORDER — ACETAMINOPHEN 325 MG/1
650 TABLET ORAL EVERY 6 HOURS PRN
Status: DISCONTINUED | OUTPATIENT
Start: 2025-07-02 | End: 2025-07-02

## 2025-07-02 RX ORDER — EPTIFIBATIDE 0.75 MG/ML
2 INJECTION, SOLUTION INTRAVENOUS CONTINUOUS
Status: DISCONTINUED | OUTPATIENT
Start: 2025-07-02 | End: 2025-07-02

## 2025-07-02 RX ORDER — MORPHINE SULFATE 4 MG/ML
1-10 INJECTION INTRAVENOUS
Status: DISCONTINUED | OUTPATIENT
Start: 2025-07-02 | End: 2025-07-02

## 2025-07-02 RX ORDER — POLYETHYLENE GLYCOL 3350 17 G/17G
1 POWDER, FOR SOLUTION ORAL
Status: DISCONTINUED | OUTPATIENT
Start: 2025-07-02 | End: 2025-07-02

## 2025-07-02 RX ORDER — HEPARIN SODIUM 1000 [USP'U]/ML
INJECTION, SOLUTION INTRAVENOUS; SUBCUTANEOUS
Status: DISCONTINUED
Start: 2025-07-02 | End: 2025-07-02

## 2025-07-02 RX ORDER — EPTIFIBATIDE 2 MG/ML
INJECTION, SOLUTION INTRAVENOUS
Status: COMPLETED
Start: 2025-07-02 | End: 2025-07-02

## 2025-07-02 RX ORDER — MORPHINE SULFATE 4 MG/ML
4 INJECTION INTRAVENOUS
Status: DISCONTINUED | OUTPATIENT
Start: 2025-07-02 | End: 2025-07-03

## 2025-07-02 RX ORDER — MAGNESIUM SULFATE HEPTAHYDRATE 40 MG/ML
2 INJECTION, SOLUTION INTRAVENOUS ONCE
Status: COMPLETED | OUTPATIENT
Start: 2025-07-02 | End: 2025-07-02

## 2025-07-02 RX ORDER — EPTIFIBATIDE 2 MG/ML
180 INJECTION, SOLUTION INTRAVENOUS ONCE
Status: COMPLETED | OUTPATIENT
Start: 2025-07-02 | End: 2025-07-02

## 2025-07-02 RX ORDER — ONDANSETRON 2 MG/ML
4 INJECTION INTRAMUSCULAR; INTRAVENOUS EVERY 4 HOURS PRN
Status: DISCONTINUED | OUTPATIENT
Start: 2025-07-02 | End: 2025-07-02

## 2025-07-02 RX ORDER — EPHEDRINE SULFATE 50 MG/ML
INJECTION, SOLUTION INTRAVENOUS PRN
Status: DISCONTINUED | OUTPATIENT
Start: 2025-07-02 | End: 2025-07-02 | Stop reason: HOSPADM

## 2025-07-02 RX ORDER — INSULIN LISPRO 100 [IU]/ML
2-9 INJECTION, SOLUTION INTRAVENOUS; SUBCUTANEOUS EVERY 6 HOURS
Status: DISCONTINUED | OUTPATIENT
Start: 2025-07-02 | End: 2025-07-02

## 2025-07-02 RX ORDER — DEXTROSE MONOHYDRATE 25 G/50ML
25 INJECTION, SOLUTION INTRAVENOUS
Status: DISCONTINUED | OUTPATIENT
Start: 2025-07-02 | End: 2025-07-02

## 2025-07-02 RX ORDER — LORAZEPAM 2 MG/ML
1-10 INJECTION INTRAMUSCULAR
Status: DISCONTINUED | OUTPATIENT
Start: 2025-07-02 | End: 2025-07-02

## 2025-07-02 RX ORDER — SODIUM CHLORIDE, SODIUM GLUCONATE, SODIUM ACETATE, POTASSIUM CHLORIDE AND MAGNESIUM CHLORIDE 526; 502; 368; 37; 30 MG/100ML; MG/100ML; MG/100ML; MG/100ML; MG/100ML
INJECTION, SOLUTION INTRAVENOUS
Status: DISCONTINUED | OUTPATIENT
Start: 2025-07-02 | End: 2025-07-02 | Stop reason: SURG

## 2025-07-02 RX ORDER — ATROPINE SULFATE 10 MG/ML
2 SOLUTION/ DROPS OPHTHALMIC EVERY 4 HOURS PRN
Status: DISCONTINUED | OUTPATIENT
Start: 2025-07-02 | End: 2025-07-03

## 2025-07-02 RX ORDER — LORAZEPAM 2 MG/ML
1 INJECTION INTRAMUSCULAR
Status: DISCONTINUED | OUTPATIENT
Start: 2025-07-02 | End: 2025-07-03

## 2025-07-02 RX ORDER — AMOXICILLIN 250 MG
2 CAPSULE ORAL EVERY EVENING
Status: DISCONTINUED | OUTPATIENT
Start: 2025-07-02 | End: 2025-07-02

## 2025-07-02 RX ORDER — ONDANSETRON 4 MG/1
4 TABLET, ORALLY DISINTEGRATING ORAL EVERY 4 HOURS PRN
Status: DISCONTINUED | OUTPATIENT
Start: 2025-07-02 | End: 2025-07-02

## 2025-07-02 RX ORDER — EPTIFIBATIDE 0.75 MG/ML
INJECTION, SOLUTION INTRAVENOUS
Status: COMPLETED
Start: 2025-07-02 | End: 2025-07-02

## 2025-07-02 RX ORDER — VERAPAMIL HYDROCHLORIDE 2.5 MG/ML
INJECTION INTRAVENOUS
Status: DISCONTINUED
Start: 2025-07-02 | End: 2025-07-02

## 2025-07-02 RX ADMIN — MAGNESIUM SULFATE HEPTAHYDRATE 2 G: 2 INJECTION, SOLUTION INTRAVENOUS at 11:12

## 2025-07-02 RX ADMIN — NIMODIPINE 60 MG: 60 SOLUTION ORAL at 14:03

## 2025-07-02 RX ADMIN — MORPHINE SULFATE 4 MG: 4 INJECTION INTRAVENOUS at 22:25

## 2025-07-02 RX ADMIN — CEFAZOLIN 2 G: 2 INJECTION, POWDER, FOR SOLUTION INTRAMUSCULAR; INTRAVENOUS at 14:03

## 2025-07-02 RX ADMIN — EPHEDRINE SULFATE 10 MG: 50 INJECTION, SOLUTION INTRAVENOUS at 09:24

## 2025-07-02 RX ADMIN — IOHEXOL 90 ML: 300 INJECTION, SOLUTION INTRAVENOUS at 11:00

## 2025-07-02 RX ADMIN — SODIUM CHLORIDE, SODIUM GLUCONATE, SODIUM ACETATE, POTASSIUM CHLORIDE AND MAGNESIUM CHLORIDE: 526; 502; 368; 37; 30 INJECTION, SOLUTION INTRAVENOUS at 08:32

## 2025-07-02 RX ADMIN — EPTIFIBATIDE 2 MCG/KG/MIN: 0.75 INJECTION, SOLUTION INTRAVENOUS at 16:10

## 2025-07-02 RX ADMIN — ACETAMINOPHEN 1000 MG: 500 TABLET ORAL at 05:46

## 2025-07-02 RX ADMIN — EPHEDRINE SULFATE 10 MG: 50 INJECTION, SOLUTION INTRAVENOUS at 09:46

## 2025-07-02 RX ADMIN — MORPHINE SULFATE 4 MG: 4 INJECTION INTRAVENOUS at 17:27

## 2025-07-02 RX ADMIN — CEFAZOLIN 2 G: 2 INJECTION, POWDER, FOR SOLUTION INTRAMUSCULAR; INTRAVENOUS at 05:48

## 2025-07-02 RX ADMIN — SODIUM CHLORIDE 2.5 MG/HR: 9 INJECTION, SOLUTION INTRAVENOUS at 01:51

## 2025-07-02 RX ADMIN — POTASSIUM PHOSPHATE, MONOBASIC POTASSIUM PHOSPHATE, DIBASIC INJECTION, 15 MMOL: 236; 224 SOLUTION, CONCENTRATE INTRAVENOUS at 11:14

## 2025-07-02 RX ADMIN — FENTANYL CITRATE 50 MCG: 50 INJECTION, SOLUTION INTRAMUSCULAR; INTRAVENOUS at 09:02

## 2025-07-02 RX ADMIN — INSULIN LISPRO 2 UNITS: 100 INJECTION, SOLUTION INTRAVENOUS; SUBCUTANEOUS at 07:39

## 2025-07-02 RX ADMIN — LORAZEPAM 1 MG: 2 INJECTION INTRAMUSCULAR; INTRAVENOUS at 22:25

## 2025-07-02 RX ADMIN — LORAZEPAM 1 MG: 2 INJECTION INTRAMUSCULAR; INTRAVENOUS at 20:22

## 2025-07-02 RX ADMIN — SODIUM CHLORIDE 12.5 MG/HR: 9 INJECTION, SOLUTION INTRAVENOUS at 15:35

## 2025-07-02 RX ADMIN — EPTIFIBATIDE 2 MCG/KG/MIN: 0.75 INJECTION, SOLUTION INTRAVENOUS at 09:56

## 2025-07-02 RX ADMIN — NIMODIPINE 60 MG: 60 SOLUTION ORAL at 05:46

## 2025-07-02 RX ADMIN — NIMODIPINE 60 MG: 60 SOLUTION ORAL at 00:50

## 2025-07-02 RX ADMIN — LORAZEPAM 2 MG: 2 INJECTION INTRAMUSCULAR; INTRAVENOUS at 17:27

## 2025-07-02 RX ADMIN — ACETAMINOPHEN 1000 MG: 500 TABLET ORAL at 11:29

## 2025-07-02 RX ADMIN — ACETAMINOPHEN 1000 MG: 500 TABLET ORAL at 00:50

## 2025-07-02 RX ADMIN — NIMODIPINE 60 MG: 60 SOLUTION ORAL at 11:09

## 2025-07-02 RX ADMIN — SODIUM CHLORIDE 7.5 MG/HR: 9 INJECTION, SOLUTION INTRAVENOUS at 12:55

## 2025-07-02 RX ADMIN — SODIUM CHLORIDE: 9 INJECTION, SOLUTION INTRAVENOUS at 16:09

## 2025-07-02 RX ADMIN — ATROPINE SULFATE 2 DROP: 10 SOLUTION/ DROPS OPHTHALMIC at 22:43

## 2025-07-02 RX ADMIN — ROCURONIUM BROMIDE 100 MG: 10 INJECTION, SOLUTION INTRAVENOUS at 08:43

## 2025-07-02 RX ADMIN — FENTANYL CITRATE 50 MCG: 50 INJECTION, SOLUTION INTRAMUSCULAR; INTRAVENOUS at 09:28

## 2025-07-02 RX ADMIN — FAMOTIDINE 20 MG: 20 TABLET, FILM COATED ORAL at 05:46

## 2025-07-02 RX ADMIN — CEFAZOLIN 2 G: 2 INJECTION, POWDER, FOR SOLUTION INTRAMUSCULAR; INTRAVENOUS at 01:00

## 2025-07-02 RX ADMIN — EPTIFIBATIDE 14688 MCG: 2 INJECTION, SOLUTION INTRAVENOUS at 09:51

## 2025-07-02 RX ADMIN — SODIUM CHLORIDE: 9 INJECTION, SOLUTION INTRAVENOUS at 00:57

## 2025-07-02 RX ADMIN — SODIUM CHLORIDE 5 MG/HR: 9 INJECTION, SOLUTION INTRAVENOUS at 08:45

## 2025-07-02 RX ADMIN — FAMOTIDINE 20 MG: 10 INJECTION, SOLUTION INTRAVENOUS at 00:50

## 2025-07-02 RX ADMIN — MORPHINE SULFATE 4 MG: 4 INJECTION INTRAVENOUS at 20:22

## 2025-07-02 ASSESSMENT — PAIN DESCRIPTION - PAIN TYPE
TYPE: ACUTE PAIN

## 2025-07-02 ASSESSMENT — PATIENT HEALTH QUESTIONNAIRE - PHQ9
SUM OF ALL RESPONSES TO PHQ9 QUESTIONS 1 AND 2: 0
2. FEELING DOWN, DEPRESSED, IRRITABLE, OR HOPELESS: NOT AT ALL
1. LITTLE INTEREST OR PLEASURE IN DOING THINGS: NOT AT ALL

## 2025-07-02 NOTE — CARE PLAN
Problem: Ventilation  Goal: Ability to achieve and maintain unassisted ventilation or tolerate decreased levels of ventilator support  Description: Target End Date:  4 days     Document on Vent flowsheet    1.  Support and monitor invasive and noninvasive mechanical ventilation  2.  Monitor ventilator weaning response  3.  Perform ventilator associated pneumonia prevention interventions  4.  Manage ventilation therapy by monitoring diagnostic test results  Outcome: Not Met     Ventilator Daily Summary    Vent Day #2  Airway: 7.0 @ 25    Ventilator settings: APVcmv  22/340/+8,30%  Weaning trials: none  Respiratory Procedures: none     Plan: Continue current ventilator settings and wean mechanical ventilation as tolerated per physician orders.

## 2025-07-02 NOTE — ASSESSMENT & PLAN NOTE
Witnessed PEA cardiac arrest  ROSC after 8 minutes of CPR  Due to subarachnoid hemorrhage  Targeted temperature management with goal temperature 37 degrees  Echocardiogram

## 2025-07-02 NOTE — ANESTHESIA PREPROCEDURE EVALUATION
Date/Time: 07/02/25 0830    Scheduled providers: Selin Hay M.D.; Filippo Llanes M.D.    Procedure: IR-EMBOLIZE-NEURO-INTRACRANIAL    Indications: Per Dr Hay    Location: [facility]            Relevant Problems   CARDIAC   (positive) Cardiac arrest (HCC)       Physical Exam    Airway - unable to assess  Mallampati: II  TM distance: >3 FB  Neck ROM: full       Cardiovascular - normal exam  Rhythm: regular  Rate: normal    (-) murmur     Dental - normal exam           Pulmonary - normal examBreath sounds clear to auscultation     Abdominal    Neurological - normal exam                   Anesthesia Plan    ASA 4- EMERGENT   ASA physical status 4 criteria: ventilator dependenceASA physical status emergent criteria: neurologic compromise requiring immediate intervention    Plan - general       Airway plan will be ETT          Induction: intravenous    Postoperative Plan: Postoperative administration of opioids is intended.    Pertinent diagnostic labs and testing reviewed    Informed Consent:    Anesthetic plan and risks discussed with legal guardian.    Use of blood products discussed with: legal guardian whom consented to blood products.

## 2025-07-02 NOTE — PROGRESS NOTES
At 2221- Dr. Petersen at bedside in ER to place EVD. ED RN x2 and RICU RN x2 at bedside for assistance.     2228- Time out called and all parties agree  2229- Drilling commenced   2230- Patient medicated for pain (see eMAR)  2231- EVD catheter inserted  2233- Sutures and staples placed  2236- Dressing applied  2237- Connected to drain with initial ICP reading of 1 noted  2238- Procedure End      Per Dr. Petersen- keep EVD at 20cm of H2O and HOB at 30 degrees

## 2025-07-02 NOTE — DISCHARGE PLANNING
Medical Social Work    Referral: Acute Medical     Intervention: LMSW responded to acute medical. Pt was BIB Care Flight from [facility]. Pt is a74 y/o female who arrived intubated. Pt is Migdalia Aguilar (: 1950). Per flight crew, several family members who were at [facility] were updated on transfer. ER lobby contacted to call SW once family arrives.     Emergency contact listed is David Tian [phone]

## 2025-07-02 NOTE — ED NOTES
Migdalia [facility]  74 y.o. female    Chief Complaint   Patient presents with    Other     Pt arrives as transfer from . Pt had a witnessed arrest by family. 1 round epi completed. ROSC obtained.    Subarachnoid hem found, possible aneurysm.        Pt biba for above complaint. Pt arrives intubated, on propofol. Pt received 3mg versed and 100mcg fentanyl en route. ERP at bedside stating to turn off sedation per neurosurgeon request.     Vitals:    07/01/25 2348   BP: 129/61   Pulse: 75   Resp:    SpO2: 99%

## 2025-07-02 NOTE — DISCHARGE PLANNING
[facility] Transitional [facility] Coordination    Referral from: Dr. Garcia    Insurance Provider on Facesheet: ANT    Potential Rehab Diagnosis: SAH    Chart review indicates patient may have on going medical management and may have therapy needs to possibly meet inpatient rehab facility criteria with the goal of returning to community.    D/C support will need to be verified: Daughter    Physiatry consultation pended per protocol.  Vented day 2.     Thank you for the referral.

## 2025-07-02 NOTE — ED NOTES
Pt transported to RICU with 3 RNs and RT. 2 family members going with pt, social work to bring other family members to RICU waiting room.

## 2025-07-02 NOTE — PROGRESS NOTES
Pt presents to IR2. Emergent case, Dr. Bourne spoke to daughter, Princess. Anesthesia care provided by .Pt transferred to IR table in supine position. Patient underwent a Intracranial Embolization by Dr. Bourne. Procedure site was marked by MD and verified using imaging guidance. Pt placed on monitor, prepped and draped in a sterile fashion. All vital signs monitoring and medication administration by anesthesia services - See anesthesia flowsheet for details. Report called to Denise SPRINGER. [facility]       MicroVention TERUMO   REI X Flow Re-Direction Endoluminal Device  OD 3.5 mm TL 22 mm WL 16mm  REF: [id number]  LOT: [id number]  EXP: 2026-06-30   Deployed at 1003 in to Right Internal Carotid Artery    TERUMO  Angio-Seal VIP  Vascular Closure Device  REF [id number]  LOT [id number]  EXP 2026-02-27

## 2025-07-02 NOTE — ED PROVIDER NOTES
ED Provider Note    CHIEF COMPLAINT  Cardiac arrest intraventricular hemorrhage with extensive subarachnoid    EXTERNAL RECORDS REVIEWED  External ED Note patient was a witnessed arrest at home 8 minutes of downtime with 1 round of epinephrine PEA arrest obtained ROSC, found to have intraventricular hemorrhage which extends to his subarachnoid at the outside facility, she was treated with sedation as well as a dose of amiodarone for a run of V. tach    Dr. Petersen was awaiting the patient's transport for EVD    HPI/ROS  LIMITATION TO HISTORY   Select: Sedated intubated  OUTSIDE HISTORIAN(S):  EMS they gave the patient a bolus of fentanyl Versed for hypertension agitation    Migdalia Aguilar is a 74 y.o. female who presents to the emergency department as a transfer for intraventricular hemorrhage with subarachnoid likely secondary to aneurysm rupture with altered mental status with a witnessed cardiac arrest prior to arrival of the outpatient facility.  According to EMS she was apparently grimacing for the team at [facility].  They treated with pain management for hypertension she is on a propofol drip and nicardipine is held at this time.  Unknown if she is on blood thinners but family had denied possible history of hypertension    They were unable to perform CTAs at the outside facility because patient had a run of V. tach was given an amiodarone bolus    PAST MEDICAL HISTORY       SURGICAL HISTORY  patient denies any surgical history    FAMILY HISTORY  No family history on file.    SOCIAL HISTORY  Social History     Tobacco Use    Smoking status: Not on file    Smokeless tobacco: Not on file   Substance and Sexual Activity    Alcohol use: Not on file    Drug use: Not on file    Sexual activity: Not on file       CURRENT MEDICATIONS  Home Medications    **Home medications have not yet been reviewed for this encounter**       Audit from Redirected Encounters    **Home medications have not yet been reviewed for  "this encounter**         ALLERGIES  Allergies[1]    PHYSICAL EXAM  VITAL SIGNS: Pulse 64   Resp (!) 22   Ht 1.676 m (5' 6\")   Wt 81.6 kg (180 lb)   SpO2 98%   BMI 29.05 kg/m²    Pulse OX: Pulse Oxygen level is normal  Constitutional: Intubated sedated  HENT: Normocephalic, Atraumatic, MMM  Eyes: Pupils are pinpoint bilaterally. Conjunctiva normal, non-icteric.   Heart: Regular rate and rhythm, intact distal pulses   Lungs: Symmetrical movement, no resp distress   Abdomen: Non-tender, non-distended, normal bowel sounds  EXT/Back no signs of trauma  Skin: Warm, Dry, No erythema, No rash.   Neurologic: No response to stimuli secondary to sedation      EKG/LABS  Labs Reviewed   CBC WITH DIFFERENTIAL - Abnormal; Notable for the following components:       Result Value    WBC 23.7 (*)     Hematocrit 36.6 (*)     Neutrophils-Polys 87.10 (*)     Lymphocytes 3.40 (*)     Neutrophils (Absolute) 20.64 (*)     Lymphs (Absolute) 0.81 (*)     Monos (Absolute) 2.30 (*)     All other components within normal limits   COMP METABOLIC PANEL - Abnormal; Notable for the following components:    Potassium 3.3 (*)     Co2 18 (*)     Glucose 189 (*)     Bun 32 (*)     Calcium 8.4 (*)     AST(SGOT) 305 (*)     ALT(SGPT) 310 (*)     All other components within normal limits   TROPONIN - Abnormal; Notable for the following components:    Troponin T 139 (*)     All other components within normal limits   PROBRAIN NATRIURETIC PEPTIDE, NT - Abnormal; Notable for the following components:    NT-proBNP 1697 (*)     All other components within normal limits   LACTIC ACID - Abnormal; Notable for the following components:    Lactic Acid 2.3 (*)     All other components within normal limits   VENOUS BLOOD GAS - Abnormal; Notable for the following components:    Venous Bg Pco2 32.4 (*)     Venous Bg Po2 56.2 (*)     Venous Bg Hco3 20 (*)     Venous Bg Base Excess -4 (*)     All other components within normal limits   LIPID PROFILE - Abnormal; " Notable for the following components:    Triglycerides 189 (*)     All other components within normal limits   POCT ARTERIAL BLOOD GAS DEVICE RESULTS - Abnormal; Notable for the following components:    Ph 7.300 (*)     Po2 181 (*)     Tco2 21 (*)     Hco3 20.1 (*)     BE -6 (*)     All other components within normal limits   POCT LACTATE DEVICE RESULTS - Abnormal; Notable for the following components:    iStat Lactate 2.2 (*)     All other components within normal limits   COD (ADULT)   ESTIMATED GFR   DIFFERENTIAL MANUAL   PERIPHERAL SMEAR REVIEW   PLATELET ESTIMATE   MORPHOLOGY   PROTHROMBIN TIME   APTT   PLATELET MAPPING WITH BASIC TEG   ABO RH CONFIRM   CBC WITH DIFFERENTIAL   COMP METABOLIC PANEL   SODIUM SERUM (NA)     Results for orders placed or performed during the hospital encounter of 25   EKG (NOW)   Result Value Ref Range    Report       [facility] Emergency Dept.    Test Date:  2025  Pt Name:    HUMPHREY RUSSO           Department: ER  MRN:        [id number]                      Room:       [facility]  Gender:     Female                       Technician: [id number]  :        1950                   Requested By:IRISH GIBBONS  Order #:    [id number]                    Reading MD: Irish Gibbons MD    Measurements  Intervals                                Axis  Rate:       79                           P:          78  MA:         135                          QRS:        56  QRSD:       108                          T:          73  QT:         457  QTc:        525    Interpretive Statements  Sinus rhythm at a rate of 79 no ST elevations mild ST depressions in the  precordial and inferior leads no abnormal T wave inversions prolonged QTc  otherwise normal intervals normal axis  No previous ECG available for comparison  Electronically Signed On 2025 00:59:02 PDT by Irish Gibbons MD         I have independently interpreted this EKG    RADIOLOGY/PROCEDURES   I have  independently interpreted the diagnostic imaging associated with this visit and am waiting the final reading from the radiologist.   My preliminary interpretation is as follows:     Chest x-ray ET tube in appropriate position  CTA neck within normal limits  CT head significant subarachnoid and intraventricular hemorrhage as seen prior with hydrocephalus    Radiologist interpretation:  DX-CHEST-LIMITED (1 VIEW)   Final Result         1.  No acute cardiopulmonary disease.   2.  Atherosclerosis      CT-CTA NECK WITH & W/O-POST PROCESSING   Final Result         1.  CT angiogram of the neck within normal limits.         CT-CTA HEAD WITH & W/O-POST PROCESS   Final Result         1.  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   2.  Subarachnoid hemorrhages, greatest at the basal cisterns, appears somewhat increased since prior study.   3.  Bilateral hydrocephalus, increased slightly since prior study, status post trach placement of ventriculostomy tube.   4.  Bilateral intraventricular hemorrhages, new since prior study.   5.  Atherosclerosis      These findings were discussed with the patient's clinician, Irish Elmore, on 7/2/2025 12:04 AM.      CT-OUTSIDE IMAGES-CT CERVICAL SPINE   Final Result      CT-OUTSIDE IMAGES-CT HEAD   Final Result      CT-HEAD W/O    (Results Pending)   EC-ECHOCARDIOGRAM COMPLETE W/O CONT    (Results Pending)       COURSE & MEDICAL DECISION MAKING    ASSESSMENT, COURSE AND PLAN  Care Narrative:     Patient is a 74-year-old female who presents to the to the emergency department with a hypertensive likely aneurysmal rupture with intraventricular spread and subarachnoid.  I alerted Dr. Petersen the patient was at the bedside as he would like to come down to place an EVD as he was already aware of the patient.  He recommends holding sedation he will place an EVD then CTAs and ICU admission and neuro IR is already aware of the  patient.    DISPOSITION AND DISCUSSIONS  10:14 PM  Dr Petersen at bedside to perform EVD      10:22 PM  Spoke w dr Talavera with neurology and states to defer to Dr. Petersen at this time is because he is already involved with the patient care, he does state the patient has an ICH score of 4 at this time.      Patient is on nicardipine drip for hypertension the goal is a systolic less than 140 per Dr. Petersen.  During the procedure she started having little spikes were going to be give labetalol however after increasing the nicardipine it was not required.    I spoke with Dr. Garcia on-call with the ICU and he has accepted the patient for hospitalization.    CRITICAL CARE  The very real possibilty of a deterioration of this patient's condition required the highest level of my preparedness for sudden, emergent intervention.  I provided critical care services, which included medication orders, frequent reevaluations of the patient's condition and response to treatment, ordering and reviewing test results, and discussing the case with various consultants.  The critical care time associated with the care of the patient was 37 minutes. Review chart for interventions. This time is exclusive of any other billable procedures.         I have discussed management of the patient with the following physicians and RADHA's:  Ilan Petersen Block    Discussion of management with other [facility] or appropriate source(s): Radiologist for CT results     FINAL DIAGNOSIS  1. Intraventricular hemorrhage (HCC)    2. SAH (subarachnoid hemorrhage) (HCC)    3. PEA arrest  4. Run of Vtach    Cct 37 min      Electronically signed by: Irish Elmore M.D., 7/1/2025 10:12 PM           [1]   Allergies  Allergen Reactions    Sulfa Drugs Unspecified     Pt was given to as a child; runs fever.

## 2025-07-02 NOTE — PROGRESS NOTES
Neurosurgery Progress Note    Subjective:  74-year-old woman of unknown medical history   who collapsed today at her home. Experienced cardiac arrest with resuscitation and transport to [facility]. CAT scan from the outside hospital showed subarachnoid hemorrhage diffusely and primarily in the prepontine area as well as in the prepontine area and casting of the third and fourth ventricles with lateral ventricular hydrocephalus bilaterally.    POD#1 EVD placement  POD#0 Flow diverter placement by IR  - findings of Right ICA aneurysm     Currently intubated off sedation with no paralytics  EVD with steady output and 5 ccs recorded in last 8 hours at [id number]  ICP 8mmHg     Exam:  GCS: E1VTM1.  Face symmetric.  PERRL 2 mm reactive but sluggish  No movement to stimulation in all 4 extremities  Depressed plantar reflex  Sensation intact.    EVD in place CSF output with slight blood tinge        BP  Min: 109/76  Max: 186/83  Pulse  Av.4  Min: 60  Max: 116  Resp  Av.3  Min: 18  Max: 38  Temp  Av.2 °C (97.2 °F)  Min: 36.2 °C (97.2 °F)  Max: 36.2 °C (97.2 °F)  Monitored Temp 2  Av.1 °C (97 °F)  Min: 36 °C (96.8 °F)  Max: 36.2 °C (97.2 °F)  SpO2  Av.2 %  Min: 94 %  Max: 99 %    ICP  Av.7 MM HG  Min: 1 MM HG  Max: 18 MM HG  CPP   Av.4  Min: 68  Max: 88    Recent Labs     25  0120 25  0550   WBC 23.7* 26.0* 20.8*   RBC 4.37 4.23 4.22   HEMOGLOBIN 12.1 11.8* 11.7*   HEMATOCRIT 36.6* 36.2* 35.4*   MCV 83.8 85.6 83.9   MCH 27.7 27.9 27.7   MCHC 33.1 32.6 33.1   RDW 47.5 48.6 47.8   PLATELETCT 330 319 317   MPV 10.2 9.8 10.0     Recent Labs     25  0120 25  0550 25  0725   SODIUM 138 139 139 141   POTASSIUM 3.3* 2.8* 3.2* 3.2*   CHLORIDE 105 106 108  --    CO2 18* 18* 18*  --    GLUCOSE 189* 252* 188*  --    BUN 32* 30* 26*  --    CREATININE 0.89 0.79 0.76  --    CALCIUM 8.4* 8.2* 8.1*  --      Recent Labs      07/02/25  0300   APTT 23.7*   INR 1.23*     Recent Labs     07/01/25  2219   REACTMIN 3.8*   CLOTKINET 0.8   CLOTANGL 79.4*   MAXCLOTS 68.6   [id number] 0.0   PRCINADP 20.0*   PRCINAA 42.3*       Intake/Output                         07/01/25 0700 - 07/02/25 0659 07/02/25 0700 - 07/03/25 0659     0700-1859 1900-0659 Total 0700-1859 1900-0659 Total                 Intake    I.V.  --  671.7 671.7  500  -- 500    Cardene Volume -- 275 275 -- -- --    Propofol Volume -- 58.1 58.1 -- -- --    Volume (mL) (NS infusion) -- 338.6 338.6 -- -- --    Volume (mL) (electrolyte-A (Plasmalyte-A) infusion) -- -- -- 500 -- 500    IV Piggyback  --  136.3 136.3  --  -- --    Volume (mL) (ceFAZolin (Ancef) 2 g in  mL IVPB) -- 136.3 136.3 -- -- --    Total Intake -- 808 808 500 -- 500       Output    Urine  --  1140 1140  --  -- --    Output (mL) (Urethral Catheter Temperature probe) -- 1140 1140 -- -- --    Drains  --  5 5  7  -- 7    Output (mL) (ICP/Ventriculostomy Right Parietal region) -- 5 5 7 -- 7    Stool  --  -- --  --  -- --    Number of Times Stooled -- 1 x 1 x -- -- --    Total Output -- 1145 1145 7 -- 7       Net I/O     -- -337 -337 493 -- 493              Intake/Output Summary (Last 24 hours) at 7/2/2025 1133  Last data filed at 7/2/2025 1036  Gross per 24 hour   Intake 1307.99 ml   Output 1152 ml   Net 155.99 ml             insulin lispro  2-9 Units Q6HRS    And    dextrose bolus  25 g Q15 MIN PRN    potassium phosphate 15 mmol in  mL ivpb  15 mmol Once    magnesium sulfate  2 g Once    acetaminophen  650 mg Q6HRS PRN    ondansetron  4 mg Q4HRS PRN    Or    ondansetron  4 mg Q4HRS PRN    senna-docusate  2 Tablet Q EVENING    And    polyethylene glycol/lytes  1 Packet QDAY PRN    verapamil       heparin       nitroglycerin 0.2 mg/mL       eptifibatide 0.75 mg/mL  2 mcg/kg/min Continuous    K+ Scale: Goal of 5  1 Each Q6HRS    labetalol  20 mg Once    ceFAZolin  2 g Q8HRS    Respiratory Therapy Consult    Continuous RT    ipratropium-albuterol  3 mL Q2HRS PRN (RT)    famotidine  20 mg Q12HRS    Or    famotidine  20 mg Q12HRS    MD Alert...Adult ICU Electrolyte Replacement per Pharmacy   PHARMACY TO DOSE    lidocaine  2 mL Q30 MIN PRN    NS   Continuous    propofol  0-80 mcg/kg/min (Ideal) Continuous    HYDROmorphone  0.5 mg Q HOUR PRN    Or    HYDROmorphone  1 mg Q HOUR PRN    acetaminophen  1,000 mg Q6HRS    Or    acetaminophen (TYLENOL) suppository  975 mg Q6HRS    niCARdipine (Cardene) Standard Infusion 0.1 mg/mL  0-15 mg/hr Continuous    niMODipine  60 mg Q4HRS       Assessment and Plan:  Hospital day # 2  POD# 0/1  EVD placed at 0 cmH2O today  Discussed with Dr. Petersen  Repeat head CT today at 1400   Q1 neuro checks  Blood pressure control  Keppra 500 BID x 7 days   Prognosis guarded  Following      Chemical prophylactic DVT therapy: No  Start date/time: TBD     Brain Compression: Yes Nontraumatic

## 2025-07-02 NOTE — ANESTHESIA POSTPROCEDURE EVALUATION
Patient: Migdalia Aguilar    Procedure Summary       Date: 07/02/25 Room / Location: [facility] - Interventional - [facility]    Anesthesia Start: 0836 Anesthesia Stop: 1036    Procedure: IR-EMBOLIZE-NEURO-INTRACRANIAL Diagnosis: (Per Dr Hay)    Scheduled Providers: Selin Hay M.D.; Filippo Llanes M.D. Responsible Provider: Filippo Llanes M.D.    Anesthesia Type: general ASA Status: 4 - Emergent            Final Anesthesia Type: general  Last vitals  BP   Blood Pressure : 128/67, Arterial BP: 157/73    Temp   36.2 °C (97.2 °F)    Pulse   63   Resp   (!) 22    SpO2   97 %      Anesthesia Post Evaluation    Patient location during evaluation: ICU  Patient participation: complete - patient cannot participate    Anesthetic complications: no  Cardiovascular status: adequate  Respiratory status: ETT  Hydration status: acceptable    PONV: none  patient was unable to participate        No notable events documented.

## 2025-07-02 NOTE — CONSULTS
DATE OF SERVICE:  07/01/2025     INPATIENT CONSULTATION     CHIEF COMPLAINT:  Subarachnoid hemorrhage, coma, and hydrocephalus.     HISTORY OF PRESENT ILLNESS:  A 74-year-old woman of unknown medical history   who collapsed today at her home.  EMS performed one round of cardiac arrest   with epinephrine.  She was revived.  Blood pressure was elevated _____   [facility].  She was transferred here for high-level   care.  She arrived on sedation, but sedation was held.  CAT scan from the   outside hospital showed subarachnoid hemorrhage diffusely and primarily in the   prepontine area as well as in the prepontine area and casting of the third   and fourth ventricles with lateral ventricular hydrocephalus bilaterally.  She   will be prepped for an external ventricular drain emergently with double   doctor consent.  There is no family available to discuss this with.     PAST MEDICAL HISTORY:  Unknown.     PAST SURGICAL HISTORY:  Unknown.     SOCIAL HISTORY:  Unknown.     PHYSICAL EXAMINATION:  NEUROLOGIC:  She is intubated, off light sedation.  She opens her eyes to deep   stimulation, but not widely.  She has pupils that are pinpoint.  She does not   move all four extremities to stimulation.     ASSESSMENT AND PLAN:  The patient has a GCS of 5, but this may be somewhat   sedation-related.  She has subarachnoid hemorrhage and IVH with hydrocephalus.   EVD is going to be placed emergently now by myself.  Neuro IR was called to   keep an eye out for the CT angiogram, which is going to be obtained after the   EVD.  The patient should go to the ICU with q. 1 hour neuro checks.  EVD will   be a 20 to avoid over drainage until the aneurysm is secured either by IR, and   if that is not possible, then the patient should be emergently transferred   pending discussion with the family.  The patient does have a significant Hunt   and Bellamy 5 and Simpson grade IV deficit currently, so the prognosis is not   great  at this time.  Standard ICU care should include blood pressure less than   140, Nimotop 60 mg q. 4 h., Keppra, and neuro checks.     Thanks for allowing me to participate in her care.        ______________________________  MD SENA Burger III/LATRICE    DD:  07/01/2025 22:55  DT:  07/02/2025 01:15    Job#:  [id number]

## 2025-07-02 NOTE — PROCEDURES
Date of service:  7/2/2025    Title:  Arterial catheter placement - radial artery    Indication: Subarachnoid hemorrhage    Narrative:    A time out was performed identifying the correct patient, correct procedure and correct location prior to this procedure.    The right wrist was prepped with chlorhexidine and draped in the usual sterile fashion.  A 20 gauge arterial catheter was placed into the right radial artery under ultrasound guidance using the technique described by Sandra without difficulty or apparent complication.  The guidewire was removed intact.  The line was sutured into place and a sterile dressing was placed over the line.  An outstanding arterial waveform is present on the monitor.  The patient tolerated the procedure quite nicely.  No complications were apparent.    This was an EMERGENT procedure.  It was medically necessary to perform this procedure emergently to prevent life threatening deterioration.    Flako Hernandez MD  Pulmonary and Critical Care Medicine

## 2025-07-02 NOTE — OR SURGEON
Immediate Post OP Note    PreOp Diagnosis: SAH IVH hydrocepahlus      PostOp Diagnosis: same    R frnotal EVD      * Surgery not found *    Anesthesiologist/Type of Anesthesia:  ETT and local    Surgical Staff:  ANA PAULA Petersen MD      Estimated Blood Loss: < 5 cc    Findings: blood timged CSF,ICP < 20    Complications: none    EVD at 20, to CTA then ICU, neuro IR and crit care aware        7/1/2025 10:48 PM Lazarsu Petersen III, M.D.

## 2025-07-02 NOTE — THERAPY
Occupational Therapy Contact Note    Patient Name: Migdalia Aguilar  Age:  74 y.o., Sex:  female  Medical Record #: [id number]  Today's Date: 7/2/2025 07/02/25 1205   Initial Contact Note    Initial Contact Note Order Received and Verified, Occupational Therapy Evaluation in Progress with Full Report to Follow.   Interdisciplinary Plan of Care Collaboration   Collaboration Comments OT sandy held off floor to IR this am will follow up tomorrow as medically indicated   Session Information   Date / Session Number  7/2 HOLD

## 2025-07-02 NOTE — ANESTHESIA TIME REPORT
Anesthesia Start and Stop Event Times       Date Time Event    7/2/2025 0836 Anesthesia Start     1036 Anesthesia Stop          Responsible Staff  07/02/25      Name Role Begin End    Filippo Llanes M.D. Anesth 0836 1036          Overtime Reason:  no overtime (within assigned shift)    Comments:

## 2025-07-02 NOTE — ED NOTES
Pt biting on tube, opening eyes and vomiting, pt suctioned. ICU MD notified, propofol ordered and started.

## 2025-07-02 NOTE — ASSESSMENT & PLAN NOTE
Suspect due to ischemic hepatopathy from cardiac arrest  Trend liver enzymes and synthetic function  Avoid hepatotoxins

## 2025-07-02 NOTE — CARE PLAN
The patient is Unstable - High likelihood or risk of patient condition declining or worsening    Shift Goals  Clinical Goals: Q1 Neuro, SBP<140, Q6 K scale, Q6 Na  Patient Goals: KELVIN  Family Goals: Updates    Progress made toward(s) clinical / shift goals:    Problem: Knowledge Deficit - Standard  Goal: Patient and family/care givers will demonstrate understanding of plan of care, disease process/condition, diagnostic tests and medications  Outcome: Progressing     Problem: Skin Integrity  Goal: Skin integrity is maintained or improved  Outcome: Progressing     Problem: Fall Risk  Goal: Patient will remain free from falls  Outcome: Progressing

## 2025-07-02 NOTE — THERAPY
07/02/25 0841   Interdisciplinary Plan of Care Collaboration   Collaboration Comments Hold PT sandy, pt going to IR

## 2025-07-02 NOTE — ASSESSMENT & PLAN NOTE
Reyes and Bellamy thGthrthathdtheth:th th4th Modified Cody thGthrthathdtheth:th th5th Pending: CT angiogram    Plan:  Hold all antiplatelet/anticoagulants  Maintain systolic blood pressure <140 mmHg and cerebral perfusion pressure >70 mmHg using nicardipine infusion as needed  Avoid vasodilators  Maintain euvolemia with normal saline IV  Administer nimodipine 60 mg every 4 hours  Implement mechanical thromboprophylaxis; pharmacologic prophylaxis to begin 24 hours after aneurysm is secured  Conduct MWF transcranial Doppler studies  Perform neurochecks per protocol  Neurointerventional and neurosurgery teams are following

## 2025-07-02 NOTE — DISCHARGE PLANNING
Case Management Discharge Planning    Admission Date: 7/1/2025  GMLOS: 7.3  ALOS: 1    6-Clicks ADL Score:    6-Clicks Mobility Score:      Anticipated Discharge Dispo: Discharge Disposition: Disch to IP rehab facility or distinct part unit (62)    DME Needed: No    Action(s) Taken:   Chart review was completed, and patient was discussed during IDT rounds.    Pt is s/p IR procedure and is intubated on ventilator support; vent day 2.     Discharge assessment was completed (see below).     Escalations Completed: None    Medically Clear: No    Next Steps:  CM RN to follow up with medical team to discuss discharge barriers or needs.     Barriers to Discharge: Medical clearance and Pending PT Evaluation    Is the patient up for discharge tomorrow: No     Care Transition Team Assessment    Patient is currently intubated and unable to participate in discharge assessment at this time; information for this assessment was obtained from her son David Feldman, Smith Lee, and daughter-in-law Lorraine Lee at the bedside.       Case management role discussed; understanding of case management role verbalized.   Demographics on face sheet verified.   Please see H&P for pertinent PMH and reason for admission.   Housing: Pt lives with her son David Feldman in a home located in [place], [place]. The address listed on the face sheet was verified to be correct mailing and discharge address.  IADLS/ADLS: Independent with IADLS/ADLS and ambulated with no use of DME at baseline prior to this admission.   Transportation: No barriers to transportation reported. Family can provide transportation assistance at discharge.   DME: None owned/used   O2: RA at baseline   Discharge support: Pt has her adult children and family for support in the community.   PCP: Not established    Insurance: Kyle   AD: None on file. Pt is not . Her oldest child is son Bruno Lee.   Discharge planning: Rehab vs SNF vs HH    Information  Source  Orientation Level: Unable to assess  Information Given By: Relative  Informant's Name: Smith Lee (son) and David Feldman (son)  Who is responsible for making decisions for patient? : Adult child    Readmission Evaluation  Is this a readmission?: No    Elopement Risk  Legal Hold: No    Discharge Preparedness  What is your plan after discharge?: Uncertain - pending medical team collaboration  What are your discharge supports?: Child, Other (comment)  Prior Functional Level: Ambulatory, Independent with Activities of Daily Living, Independent with Medication Management  Difficulity with ADLs: None  Difficulity with IADLs: None    Functional Assesment  Prior Functional Level: Ambulatory, Independent with Activities of Daily Living, Independent with Medication Management    Finances  Financial Barriers to Discharge: No  Prescription Coverage: Yes    Advance Directive  Advance Directive?: None    Domestic Abuse  Have you ever been the victim of abuse or violence?: No    Psychological Assessment  History of Substance Abuse: None  History of Psychiatric Problems: No  Non-compliant with Treatment: No  Newly Diagnosed Illness: No    Discharge Risks or Barriers  Discharge risks or barriers?: Complex medical needs  Patient risk factors: Complex medical needs    Anticipated Discharge Information  Discharge Disposition: Disch to  rehab facility or distinct part unit (62)

## 2025-07-02 NOTE — H&P
[facility] Critical Care History & Physical Note    Date of Service  7/2/2025    R Team: R ICU Gold Team   Attending: Jj Garcia M.d.  Senior Resident: Dr. Kolton Alvarez  Contact Number: [phone]    Primary Care Physician  Pcp Pt States None    Consultants  neurosurgery      Code Status  Full Code    Chief Complaint  Chief Complaint   Patient presents with    Other     Pt arrives as transfer from . Pt had a witnessed arrest by family. 1 round epi completed. ROSC obtained.    Subarachnoid hem found, possible aneurysm.          History of Presenting Illness (HPI):   The patient, a 74-year-old woman, experienced a witnessed out-of-hospital pulseless electrical activity (PEA) arrest, necessitating approximately 8 minutes of cardiopulmonary resuscitation (CPR). Initially, she was transported to [facility], where she was diagnosed with a subarachnoid hemorrhage (SAH) accompanied by intraventricular hemorrhage (IVH). Upon her arrival at our facility, an external ventricular drain (EVD) was placed.    According to the patient's daughter, Lorraine, the patient suddenly ceased breathing while watching TV at home. The family initiated CPR, which lasted about 4 minutes, and was subsequently continued by emergency medical services (EMS) for an additional 5 minutes.    Approximately 5 weeks earlier, the patient had experienced a fall and was reportedly diagnosed with a stroke. At that time, she was noted to be hypertensive but chose to leave the hospital against medical advice.    I discussed the plan of care with family.    Review of Systems  ROS  Unable to perform:Critical illness    Past Medical History   has no past medical history on file.    Surgical History   has no past surgical history on file.     Family History  No family history on file.   Family history reviewed with patient.     Allergies  Allergies[1]    Medications  Prior to Admission Medications   Prescriptions Last Dose Informant Patient  Reported? Taking?   ondansetron (ZOFRAN ODT) 4 MG TABLET DISPERSIBLE   No No   Sig: Take 1 Tablet by mouth every 8 hours as needed.   predniSONE (DELTASONE) 20 MG Tab   No No   Si tablets PO QD for 3 days      Facility-Administered Medications: None       Physical Exam  Temp:  [36.2 °C (97.2 °F)] 36.2 °C (97.2 °F)  Pulse:  [] 75  Resp:  [18-38] 38  BP: (119-186)/(56-89) 129/61  SpO2:  [94 %-99 %] 99 %  Blood Pressure : 129/61   Temperature: 36.2 °C (97.2 °F)   Pulse: 75   Respiration: (!) 38   Pulse Oximetry: 99 %       Physical Exam  General: The patient is not in acute distress.  Head, Eyes, Nose, and Throat (HENT):  Head: Right frontal external ventricular drain (EVD) is present. Endotracheal tube (ETT) is in situ.  Eyes: No scleral icterus observed.  Cardiovascular:  Rate and Rhythm: Normal heart rate with regular rhythm.  Pulmonary:  Effort: No respiratory distress noted.  Abdominal:  General: Abdomen is not distended.  Palpation: Abdomen is soft.  Musculoskeletal:  General: No swelling observed.  Skin:  General: Skin is warm to touch.  Neurological:  Positive cough and gag reflexes.  Patient gnaws on the endotracheal tube.  Opens eyes spontaneously.  Pupils are symmetric and measure 3 mm.  No withdrawal response to pain in any extremity.    Laboratory:  Recent Labs     25   WBC 23.7*   RBC 4.37   HEMOGLOBIN 12.1   HEMATOCRIT 36.6*   MCV 83.8   MCH 27.7   MCHC 33.1   RDW 47.5   PLATELETCT 330   MPV 10.2     Recent Labs     25   SODIUM 138   POTASSIUM 3.3*   CHLORIDE 105   CO2 18*   GLUCOSE 189*   BUN 32*   CREATININE 0.89   CALCIUM 8.4*     Recent Labs     25   ALTSGPT 310*   ASTSGOT 305*   ALKPHOSPHAT 82   TBILIRUBIN 0.4   GLUCOSE 189*         Recent Labs     25   NTPROBNP 1697*     Recent Labs     25   TRIGLYCERIDE 189*   HDL 53   LDL 84     Recent Labs     25   TROPONINT 139*       Imaging:  DX-CHEST-LIMITED (1 VIEW)   Final  Result         1.  No acute cardiopulmonary disease.   2.  Atherosclerosis      CT-CTA NECK WITH & W/O-POST PROCESSING   Final Result         1.  CT angiogram of the neck within normal limits.         CT-OUTSIDE IMAGES-CT CERVICAL SPINE   Final Result      CT-OUTSIDE IMAGES-CT HEAD   Final Result      CT-CTA HEAD WITH & W/O-POST PROCESS    (Results Pending)   CT-HEAD W/O    (Results Pending)   EC-ECHOCARDIOGRAM COMPLETE W/O CONT    (Results Pending)           Assessment/Plan:    I anticipate this patient will require at least two midnights for appropriate medical management, necessitating inpatient admission because SAH    Patient will need a ICU (Adult and Pediatrics) bed on MEDICAL service .  The need is secondary to SAH.    * Subarachnoid hemorrhage (HCC)  Assessment & Plan  Reyes and Bellamy thGthrthathdtheth:th th4th Modified Cody thGthrthathdtheth:th th5th Pending: CT angiogram    Plan:  Hold all antiplatelet/anticoagulants  Maintain systolic blood pressure <140 mmHg and cerebral perfusion pressure >70 mmHg using nicardipine infusion as needed  Avoid vasodilators  Maintain euvolemia with normal saline IV  Administer nimodipine 60 mg every 4 hours  Implement mechanical thromboprophylaxis; pharmacologic prophylaxis to begin 24 hours after aneurysm is secured  Conduct [facility] transcranial Doppler studies  Perform neurochecks per protocol  Neurointerventional and neurosurgery teams are following    Acute respiratory failure (HCC)  Assessment & Plan  Intubation Date: 7/1    Plan:  Titrate FiO2 to maintain saturation between 92-96%  Set tidal volume to 6-8 mL/kg ideal body weight  Provide gastrointestinal prophylaxis  Ensure oral hygiene  Conduct daily spontaneous awakening trials (SAT) and spontaneous breathing trials (SBT)  Elevate head of bed    Elevated LFTs  Assessment & Plan  Likely Cause: Related to cardiac arrest  Management: Monitor    Cardiac arrest (HCC)- (present on admission)  Assessment & Plan  Type: PEA arrest with 8 minutes of CPR, likely  secondary to SAH  Previous Event: Run of ventricular tachycardia at [facility], treated with amiodarone  Plan:  Targeted temperature management at 37°C  Echocardiogram  Monitor off amiodarone        VTE prophylaxis: SCDs/TEDs         [1]   Allergies  Allergen Reactions    Sulfa Drugs Unspecified     Pt was given to as a child; runs fever.

## 2025-07-02 NOTE — ASSESSMENT & PLAN NOTE
Due to ruptured right ICA aneurysm  S/P emergent EVD placement by Dr. Petersen on 7/1  S/P flow diverter placement on 7/2  Strict blood pressure control with goal SBP less than 140 - I am titrating nicardipine to achieve blood pressure goals  Nimodipine, 60 mg every 4 hours  Integrilin infusion

## 2025-07-02 NOTE — PROGRESS NOTES
Critical Care Progress Note    Date of admission  7/1/2025    Chief Complaint  74 y.o. female admitted 7/1/2025 with a witnessed PEA cardiac arrest and subarachnoid hemorrhage.    Hospital Course      7/2 -    Vent day 2.  EVD in place.  Strict blood pressure control.  Titrating nicardipine.  Flow diverter placement.  Integrilin infusion.      Interval Problem Update  Reviewed last 24 hour events:      SR  Nicardipine  ICP - 6-12  EVD at 15  -329 mL in the last 24      Review of Systems  Review of Systems   Unable to perform ROS: Acuity of condition        Vital Signs for last 24 hours   Temp:  [36.2 °C (97.2 °F)] 36.2 °C (97.2 °F)  Pulse:  [] 63  Resp:  [18-38] 22  BP: (109-186)/(56-89) 128/67  SpO2:  [94 %-99 %] 97 %    Hemodynamic parameters for last 24 hours       Respiratory Information for the last 24 hours  Vent Mode: APVCMV  Rate (breaths/min): 22  Vt Target (mL): 3340  PEEP/CPAP: 8  MAP: 12  Control VTE (exp VT): 338    Physical Exam   Physical Exam  HENT:      Head:      Comments: EVD in place  Cardiovascular:      Comments: Sinus rhythm  Pulmonary:      Breath sounds: No wheezing or rales.   Abdominal:      General: There is no distension.      Tenderness: There is no abdominal tenderness.   Musculoskeletal:      Right lower leg: No edema.      Left lower leg: No edema.   Skin:     General: Skin is warm.   Neurological:      Comments: Pupils are 2-3 mm bilaterally and brisk.  She has a cough reflex.         Medications  Current Medications[1]    Fluids    Intake/Output Summary (Last 24 hours) at 7/2/2025 1211  Last data filed at 7/2/2025 1036  Gross per 24 hour   Intake 1307.99 ml   Output 1152 ml   Net 155.99 ml       Laboratory  Recent Labs     07/01/25  2320 07/02/25  0233   ISTATAPH 7.300* 7.330*   ISTATAPCO2 40.8 35.2   ISTATAPO2 181* 182*   ISTATATCO2 21* 20*   [id number] 99 100*   ISTATARTHCO3 20.1* 18.6*   ISTATARTBE -6* -7*   ISTATTEMP see below 36.0 C   ISTATFIO2 50 50   ISTATSPEC  Arterial Arterial   ISTATAPHTC  --  7.344*   [id number]  --  177*         Recent Labs     07/01/25 2219 07/02/25 0120 07/02/25  0550 07/02/25  0725   SODIUM 138 139 139 141   POTASSIUM 3.3* 2.8* 3.2* 3.2*   CHLORIDE 105 106 108  --    CO2 18* 18* 18*  --    BUN 32* 30* 26*  --    CREATININE 0.89 0.79 0.76  --    MAGNESIUM  --   --  1.9  --    PHOSPHORUS  --   --  2.0*  --    CALCIUM 8.4* 8.2* 8.1*  --      Recent Labs     07/01/25 2219 07/02/25 0120 07/02/25  0550   ALTSGPT 310* 291* 265*   ASTSGOT 305* 252* 191*   ALKPHOSPHAT 82 81 77   TBILIRUBIN 0.4 0.4 0.4   GLUCOSE 189* 252* 188*     Recent Labs     07/01/25 2219 07/02/25 0120 07/02/25  0550   WBC 23.7* 26.0* 20.8*   NEUTSPOLYS 87.10* 94.00* 93.10*   LYMPHOCYTES 3.40* 0.90* 4.30*   MONOCYTES 9.50 5.10 1.70   EOSINOPHILS 0.00 0.00 0.00   BASOPHILS 0.00 0.00 0.00   ASTSGOT 305* 252* 191*   ALTSGPT 310* 291* 265*   ALKPHOSPHAT 82 81 77   TBILIRUBIN 0.4 0.4 0.4     Recent Labs     07/01/25 2219 07/02/25 0120 07/02/25  0300 07/02/25  0550   RBC 4.37 4.23  --  4.22   HEMOGLOBIN 12.1 11.8*  --  11.7*   HEMATOCRIT 36.6* 36.2*  --  35.4*   PLATELETCT 330 319  --  317   PROTHROMBTM  --   --  15.6*  --    APTT  --   --  23.7*  --    INR  --   --  1.23*  --        Imaging  X-Ray:  I have personally reviewed the images and compared with prior images. and My impression is: Clear lungs    Assessment/Plan  * Subarachnoid hemorrhage (HCC)  Assessment & Plan  Due to ruptured right ICA aneurysm  S/P emergent EVD placement by Dr. Petersen on 7/1  S/P flow diverter placement on 7/2  Strict blood pressure control with goal SBP less than 140 - I am titrating nicardipine to achieve blood pressure goals  Nimodipine, 60 mg every 4 hours  Integrilin infusion    Acute respiratory failure with hypoxia (HCC)  Assessment & Plan  Intubated 7/1  ABCDEF bundle  SAT/SBT as appropriate  Mobility level 1    Cardiac arrest (HCC)- (present on admission)  Assessment & Plan  Witnessed PEA  cardiac arrest  ROSC after 8 minutes of CPR  Due to subarachnoid hemorrhage  Targeted temperature management with goal temperature 37 degrees  Echocardiogram    Elevated LFTs  Assessment & Plan  Suspect due to ischemic hepatopathy from cardiac arrest  Trend liver enzymes and synthetic function  Avoid hepatotoxins         VTE:  Contraindicated  Ulcer: H2 Antagonist  Lines: Central Line  Ongoing indication addressed and Reyes Catheter  Ongoing indication addressed    I have performed a physical exam and reviewed and updated ROS and Plan today (7/2/2025). In review of yesterday's note (7/1/2025), there are no changes except as documented above.     I have assessed and reassessed her respiratory status with ventilator adjustments, airway mechanics, ventilator waveforms, blood pressure, hemodynamics, cardiovascular status as well as her neurologic status.  She is at high risk for worsening respiratory, cardiovascular and CNS system dysfunction.    Discussed patient condition and risk of morbidity and/or mortality with RN, RT, and Pharmacy    The patient remains critically ill.  Critical care time = 35 minutes in directly providing and coordinating critical care and extensive data review.  No time overlap and excludes procedures.    Flako Hernandez MD  Pulmonary and Critical Care Medicine         [1]   Current Facility-Administered Medications   Medication Dose Route Frequency Provider Last Rate Last Admin    insulin lispro (HumaLOG,AdmeLOG) subcutaneous injection  2-9 Units Subcutaneous Q6HRS Flako Hernandez M.D.   2 Units at 07/02/25 0739    And    dextrose 50 % (D50W) injection 25 g  25 g Intravenous Q15 MIN PRN Flako Hernandez M.D.        potassium phosphate 15 mmol in  mL ivpb  15 mmol Intravenous Once Flako Hernandez M.D. 125 mL/hr at 07/02/25 1114 15 mmol at 07/02/25 1114    magnesium sulfate IVPB premix 2 g  2 g Intravenous Once Flako Hernandez M.D. 25 mL/hr at 07/02/25  1114 Rate Verify at 07/02/25 1114    acetaminophen (Tylenol) tablet 650 mg  650 mg Enteral Tube Q6HRS PRN Flako Hernandez M.D.        ondansetron (Zofran ODT) dispertab 4 mg  4 mg Enteral Tube Q4HRS PRN Flako Hernandez M.D.        Or    ondansetron (Zofran) syringe/vial injection 4 mg  4 mg Intravenous Q4HRS PRN Flako Hernandez M.D.        senna-docusate (Pericolace Or Senokot S) 8.6-50 MG per tablet 2 Tablet  2 Tablet Enteral Tube Q EVENING Flako Hernandez M.D.        And    polyethylene glycol/lytes (Miralax) Packet 1 Packet  1 Packet Enteral Tube QDAY PRN Flako Hernandez M.D.        VERAPAMIL HCL 2.5 MG/ML IV SOLN             HEPARIN SODIUM (PORCINE) 1000 UNIT/ML INJ SOLN             NITROGLYCERIN 2 MG IV SOLN             eptifibatide 0.75 mg/mL (Integrilin) infusion  2 mcg/kg/min Intravenous Continuous Kin Bourne M.D. 13.056 mL/hr at 07/02/25 0956 2 mcg/kg/min at 07/02/25 0956    K+ Scale: Goal of 5  1 Each Intravenous Q6HRS Flako Hernandez M.D.        labetalol (Normodyne/Trandate) injection 20 mg  20 mg Intravenous Once Irish Elmore M.D.        ceFAZolin (Ancef) 2 g in  mL IVPB  2 g Intravenous Q8HRS Lazarus Petersen III, M.D.   Stopped at 07/02/25 0618    Respiratory Therapy Consult   Nebulization Continuous RT Jj Garcia M.D.        ipratropium-albuterol (DUONEB) nebulizer solution  3 mL Nebulization Q2HRS PRN (RT) Jj Garcia M.D.        famotidine (Pepcid) tablet 20 mg  20 mg Enteral Tube Q12HRS Jj Garcia M.D.   20 mg at 07/02/25 0546    Or    famotidine (Pepcid) injection 20 mg  20 mg Intravenous Q12HRS Jj Garcia M.D.   20 mg at 07/02/25 0050    MD Alert...ICU Electrolyte Replacement per Pharmacy   Other PHARMACY TO DOSE Jj Garcia M.D.        lidocaine (Xylocaine) 1 % injection 2 mL  2 mL Tracheal Tube Q30 MIN PRN Jj Garcia M.D.        NS infusion   Intravenous Continuous Jj Garcia M.D. 80 mL/hr at 07/02/25 0319  Restarted at 07/02/25 1053    propofol (DIPRIVAN) injection  0-80 mcg/kg/min (Ideal) Intravenous Continuous Jj Garcia M.D. 7.1 mL/hr at 07/02/25 0225 20 mcg/kg/min at 07/02/25 0225    HYDROmorphone (Dilaudid) injection 0.5 mg  0.5 mg Intravenous Q HOUR PRN Jj Garcia M.D.        Or    HYDROmorphone (Dilaudid) injection 1 mg  1 mg Intravenous Q HOUR PRN Jj Garcia M.D.        acetaminophen (Tylenol) tablet 1,000 mg  1,000 mg Enteral Tube Q6HRS Jj Garcia M.D.   1,000 mg at 07/02/25 1129    Or    acetaminophen (Tylenol) suppository 975 mg  975 mg Rectal Q6HRS Jj Garcia M.D.        niCARdipine (Cardene) 25 mg in  mL Standard Infusion  0-15 mg/hr Intravenous Continuous Jj Garcia M.D. 50 mL/hr at 07/02/25 1115 5 mg/hr at 07/02/25 1115    niMODipine (Nymalize) oral syringe 60 mg  60 mg Enteral Tube Q4HRS Jj Garcia M.D.   60 mg at 07/02/25 1109

## 2025-07-02 NOTE — ASSESSMENT & PLAN NOTE
"Addendum 0039    Updated David Sánchez Kelly (children) on aSAH and prognosis which I quoted ~60-80% mortality based on HH5 aSAH.  She has been averse to healthcare.  Migdalia would be \"horrified,\" if she saw the condition she was in right now.  Several of the children do not think she would want restorative care let alone life supportive measures, however, there is another son coming from [place] tomorrow and David is unsure of how to proceed.      They will plan on discussing as a family.  We will continue restorative care and full code status at present.  "

## 2025-07-02 NOTE — PROCEDURES
Central Line Insertion    Date/Time: 7/2/2025 3:34 AM    Performed by: Jj Garcia M.D.  Authorized by: Jj Garcia M.D.    Pre-procedure details:     Hand hygiene: Hand hygiene performed prior to insertion      Sterile barrier technique: All elements of maximal sterile technique followed      Skin preparation:  2% chlorhexidine    Skin preparation agent: Skin preparation agent completely dried prior to procedure    Anesthesia:     Anesthesia method:  Local infiltration    Local anesthetic:  Lidocaine 1% w/o epi  Procedure details:     Location:  L subclavian    Procedural supplies:  Triple lumen    Catheter size:  7 Fr    Ultrasound guidance: yes      Sterile ultrasound techniques: Sterile gel and sterile probe covers were used      Number of attempts:  1  Post-procedure details:     Post-procedure:  Line sutured    Assessment:  Blood return through all ports    Patient tolerance of procedure:  Tolerated well, no immediate complications  Comments:      Placed with Dr. Tenorio

## 2025-07-02 NOTE — PROGRESS NOTES
4 Eyes Skin Assessment Completed by RACHEAL Lopez and RACHEAL Alfred.    Skin assessment is primarily focused on high risk bony prominences. Pay special attention to skin beneath and around medical devices, high risk bony prominences, skin to skin areas and areas where the patient lacks sensation to feel pain and areas where the patient previously had breakdown.     Head (Occipital):  WDL   Ears (Under Medical Devices): WDL   Nose (Under Medical Devices): Abrasion and Red   Mouth:  Traumatic wound, Laceration, and Red   Neck: WDL   Breast/Chest:  Red, Purple/maroon, and Bruising brown dry patch, CVC   Shoulder Blades:  Abrasion and Red   Spine:   WDL   (R) Arm/Elbow/Hand: Abrasion, Red, Blanching, and Bruising Edema   (L) Arm/Elbow/Hand: Red, Blanching, Bruising, and Edema   Abdomen: WDL   Pannus/Groin:  WDL   Sacrum/Coccyx:   Red, Non-Blanching, and Excoriation/scratched   (R) Ischial Tuberosity (Sit Bones):  WDL   (L) Ischial Tuberosity (Sit Bones):  WDL   (R) Leg:  Bruisingspider veins   (L) Leg:  Bruising IO, spider veins   (R) Heel:  Red and Blanching   (R) Foot/Toe: Red and Blanching   (L) Heel: Red and Blanching   (L) Foot/Toe:  Red and Blanching       DEVICES IN USE:   Respiratory Devices:  ET Tube  Feeding Devices:  NG tube   Lines & BP Monitoring Devices:  Peripheral IV, Central line, BP cuff, and Pulse ox    Orthopedic Devices:  N/A  Miscellaneous Devices:  Drains, Reyes, SCDs, and Soft restraints    PROTOCOL INTERVENTIONS:   ICU Low Airloss Bed:  Already in place  Offloading Dressing - Sacrum:  Already in place  Offloading Dressing - Heel:  Already in place  Q2 Turns with Pillows:  Already in place  Glide Sheet:  Already in place  Reyes:  Already in place    WOUND PHOTOS:   Completed and in EPIC     WOUND CONSULT:   Consult to be ordered for the following areas Sacrum

## 2025-07-02 NOTE — OR SURGEON
Immediate Post- Operative Note        Findings: Ruptured Right ICA aneurysm      Procedure(s): Flow diverter placement    On Integrilin drip    Please convert to DAPT- Brilinta and asprin loading dose within 24 hours    Please stop the Integrilin after 2 hours of loading doses.    Maintenance DAPT-6 months      Estimated Blood Loss: Less than 5 ml        Complications: None            7/2/2025     10:20 AM     Kin Bourne M.D.

## 2025-07-02 NOTE — ASSESSMENT & PLAN NOTE
Intubation Date: 7/1    Plan:  Titrate FiO2 to maintain saturation between 92-96%  Set tidal volume to 6-8 mL/kg ideal body weight  Provide gastrointestinal prophylaxis  Ensure oral hygiene  Conduct daily spontaneous awakening trials (SAT) and spontaneous breathing trials (SBT)  Elevate head of bed

## 2025-07-02 NOTE — THERAPY
Speech Language Therapy Contact Note    Patient Name: Migdalia Aguilar  Age:  74 y.o., Sex:  female  Medical Record #: [id number]  Today's Date: 7/2/2025 07/02/25 0703   Treatment Variance   Reason For Missed Therapy Medical - Patient on Hold from Therapy;Medical - Patient not Able To Participate;Medical - Patient Is Not Medically Stable   Initial Contact Note    Initial Contact Note  Order Received and Verified, Speech Therapy Evaluation in Progress with Full Report to Follow.   Interdisciplinary Plan of Care Collaboration   IDT Collaboration with  Other (See Comments)  (EMR)   Collaboration Comments Orders received for a CSE and cognitive evaluation. Per EMR, patient is currently intubated. SLP to hold and monitor for extubation.

## 2025-07-02 NOTE — PROGRESS NOTES
Pulmonary and Critical Care Medicine Progress Note    I had a nice chat with the family.  There are several of this lady's children at the bedside.  After extensive discussion, they have decided to transition to comfort measures only.  This is consistent with this lady's previously expressed wishes.    Flako Hernandez MD  Pulmonary and Critical Care Medicine

## 2025-07-02 NOTE — ASSESSMENT & PLAN NOTE
Type: PEA arrest with 8 minutes of CPR, likely secondary to SAH  Previous Event: Run of ventricular tachycardia at [facility], treated with amiodarone  Plan:  Targeted temperature management at 37°C  Echocardiogram  Monitor off amiodarone

## 2025-07-02 NOTE — RESPIRATORY CARE
S.T.O.P for Intrahospital Transportation Safety for Ventilated Patients     S - Paulson then maneuver.  Were airway secretions cleared? Yes    T - Check your tube. Is your airway patent and in correct position? Yes    O - Transition from oxygen cylinder to wall oxygen source on return.  Was this accomplished?  Yes    P - Transition from ventilator battery power to wall power source on return.  Was this accomplished?  Yes

## 2025-07-02 NOTE — OP REPORT
DATE OF SERVICE:  07/01/2025     PREOPERATIVE DIAGNOSIS:  Subarachnoid hemorrhage with intraventricular   hemorrhage, presumptively ruptured aneurysm, hydrocephalus.     POSTOPERATIVE DIAGNOSIS:  Subarachnoid hemorrhage with intraventricular   hemorrhage, presumptively ruptured aneurysm, hydrocephalus.     PROCEDURE PERFORMED:  Right frontal external ventricular drain.     SURGEON:  Lazarus Petersen III, MD     ASSISTANT:  None.     BLOOD LOSS:  Less than 5 mL.     FINDINGS:  Blood-tinged CSF.  ICP less than 20.     COMPLICATIONS:  None.     DRAINS:  As I mentioned.     DISPOSITION:  To CTA and then to the ICU, neuro IR aware, standard   postaneurysmal care.     HISTORY OF PRESENT ILLNESS:  This is a 74-year-old woman who has an unknown   medical history who I was called at 7:30 for [facility]   with a CT scan appearing of aneurysmal subarachnoid hemorrhage with IVH and   hydrocephalus.  I asked for emergent transfer.  Once the patient got here, I   was at the bedside in the ER.  There is no family available to discuss consent   for an EVD.  The risks and benefits would have been explained to the family   if they were here, including pain, infection, bleeding, CSF leak, failure to   completely resolve symptoms, neurologic deficit, and need for permanent   shunting.  A double doctor consent was obtained and a proper timeout was   performed.     SUMMARY OF OPERATIVE PROCEDURE:  The patient was in the ER suite and sterile   area was created with proper timeout and double doctor consent.  The right   frontal cranium was clipped of hair and an incision 9 cm back from the   glabella and the mid pupillary line on the right was drawn out.  The patient   was prepped and draped in a sterile fashion after the incision was infiltrated   with lidocaine.     A linear incision was made and the soft tissue was held back with   self-retaining retractor.  We did a twist drill nacho hole, punctured the dura,   and  antibiotic-impregnated EVD catheter was passed into the ventricle,   hearing a pop at 6 cm of the skull.  This was tunneled out laterally and   secured to the skin with stitch.  We then closed the wound with staples and   secured the EVD at 2 points in the skull.  Sterile dressing was applied.     There were no complications.  Needle and sponge count correct at the end of   the case.        ______________________________  MD SENA Burger III/BRYANNA    DD:  07/01/2025 22:52  DT:  07/01/2025 23:29    Job#:  [id number]

## 2025-07-02 NOTE — H&P
"Critical Care History & Physical Note    Date of Service  2025    Code Status  Full Code    Chief Complaint  Chief Complaint   Patient presents with    Other     Pt arrives as transfer from . Pt had a witnessed arrest by family. 1 round epi completed. ROSC obtained.    Subarachnoid hem found, possible aneurysm.          History of Presenting Illness  Migdalia Aguilar is a 74 y.o. female no known PMHx (does not have PCP) who presented from [facility] after witnessed out of hospital PEA arrest requiring 8 minutes of CPR.  She was brought to [facility] where she was found to have SAH with IVH.  EVD was placed on arrival.    Labs and CT angiogram are pending.    Discussed with Lorraine, daughter.  She was at home watching TV this evening and suddenly stopped breathing.  Family began CPR for ~4 mins.  EMS arrived and provided CPR for another ~5 minutes.     She was at [facility] ~5 weeks ago when she fell and \"had a stroke.\"  She was hypertensive.  She was lucid, however, and left against medical advice.  She was improving in the interim.      No family history of SAH.       Review of Systems  ROS    Past Medical History   has no past medical history on file.    Surgical History   has no past surgical history on file.     Family History  No family history on file.     Family history reviewed with patient. There is no family history that is pertinent to the chief complaint.     Social History       Allergies  Allergies[1]    Medications  Prior to Admission Medications   Prescriptions Last Dose Informant Patient Reported? Taking?   ondansetron (ZOFRAN ODT) 4 MG TABLET DISPERSIBLE   No No   Sig: Take 1 Tablet by mouth every 8 hours as needed.   predniSONE (DELTASONE) 20 MG Tab   No No   Si tablets PO QD for 3 days      Facility-Administered Medications: None       Physical Exam  Temp:  [36.2 °C (97.2 °F)] 36.2 °C (97.2 °F)  Pulse:  [] 67  Resp:  [18-38] 23  BP: (114-186)/(56-89) 114/59  SpO2:  " [94 %-99 %] 99 %          Pulse: 64   Respiration: (!) 22   Pulse Oximetry: 98 %       Physical Exam  Vitals reviewed.   Constitutional:       General: She is not in acute distress.  HENT:      Head:      Comments: R frontal EVD  ETT in situ  Eyes:      General: No scleral icterus.  Cardiovascular:      Rate and Rhythm: Normal rate and regular rhythm.   Pulmonary:      Effort: No respiratory distress.   Abdominal:      General: There is no distension.      Palpations: Abdomen is soft.   Musculoskeletal:         General: No swelling.   Skin:     General: Skin is warm.   Neurological:      Comments: +cough  +gag  Gnaws on ETT  Opens eyes  Pupils symmetric, 3mm  No withdrawal to pain in any extremity         Laboratory:  Recent Labs     07/01/25 2219   WBC 23.7*   RBC 4.37   HEMOGLOBIN 12.1   HEMATOCRIT 36.6*   MCV 83.8   MCH 27.7   MCHC 33.1   RDW 47.5   PLATELETCT 330   MPV 10.2     Recent Labs     07/01/25 2219   SODIUM 138   POTASSIUM 3.3*   CHLORIDE 105   CO2 18*   GLUCOSE 189*   BUN 32*   CREATININE 0.89   CALCIUM 8.4*     Recent Labs     07/01/25 2219   ALTSGPT 310*   ASTSGOT 305*   ALKPHOSPHAT 82   TBILIRUBIN 0.4   GLUCOSE 189*         Recent Labs     07/01/25  2219   NTPROBNP 1697*     Recent Labs     07/01/25  2219   TRIGLYCERIDE 189*   HDL 53   LDL 84     Recent Labs     07/01/25  2219   TROPONINT 139*       Imaging:  DX-CHEST-LIMITED (1 VIEW)   Final Result         1.  No acute cardiopulmonary disease.   2.  Atherosclerosis      CT-CTA NECK WITH & W/O-POST PROCESSING   Final Result         1.  CT angiogram of the neck within normal limits.         CT-CTA HEAD WITH & W/O-POST PROCESS   Final Result         1.  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   2.  Subarachnoid hemorrhages, greatest at the basal cisterns, appears somewhat increased since prior study.   3.  Bilateral hydrocephalus, increased slightly since prior study, status post  "trach placement of ventriculostomy tube.   4.  Bilateral intraventricular hemorrhages, new since prior study.   5.  Atherosclerosis      These findings were discussed with the patient's clinician, Irish Elmore, on 7/2/2025 12:04 AM.      CT-OUTSIDE IMAGES-CT CERVICAL SPINE   Final Result      CT-OUTSIDE IMAGES-CT HEAD   Final Result      CT-HEAD W/O    (Results Pending)   EC-ECHOCARDIOGRAM COMPLETE W/O CONT    (Results Pending)       Assessment/Plan:    * Subarachnoid hemorrhage (HCC)  Assessment & Plan  Ictus 7/1.  HH5, mFG4.  CT angio pending  hold all antiplatelet/anticoagulants  SBP<140, CPP >70 nicardipine infusion as needed.  Avoid vasodilators  maintain euvolemia with normal saline IV  nimodipine 60 mg q4h  mechanical thromboprophylaxis, pharmacologic prophylaxis to begin 24 hours after aneurysm secured  MW TCDs  neurochecks per protocol   neurointerventional and neurosurgery following.   Discussed with Dr. Petersen        Cardiac arrest (HCC)- (present on admission)  Assessment & Plan  PEA arrest with 8 min CPR, likely due to SAH.  Reportedly had run of VT at [facility] for which she was given amiodarone  TTM 37C  Echo  Monitor off amio    Advanced care planning/counseling discussion  Assessment & Plan  Addendum 0039    Updated David Sánchez Kelly (children) on aSAH and prognosis which I quoted ~60-80% mortality based on HH5 aSAH.  She has been averse to healthcare.  Migdalia would be \"horrified,\" if she saw the condition she was in right now.  Several of the children do not think she would want restorative care let alone life supportive measures, however, there is another son coming from [place] tomorrow and David is unsure of how to proceed.      They will plan on discussing as a family.  We will continue restorative care and full code status at present.    Acute respiratory failure (HCC)  Assessment & Plan  Intubated on 7/1.  titrate FiO2 to saturation 92-96%  Vt 6-8mL/kg IBW   GI prophlyaxis  Oral " hygeine  daily SAT/SBT  HOB elevation      Elevated LFTs  Assessment & Plan  Likely related to cardiac arrest.    Monitor      Leukocytosis  Assessment & Plan  Likely demargination  Monitor off antibiotics        VTE prophylaxis: SCDs/TEDs    Critical care time: 105 minutes, excluding procedure.         [1]   Allergies  Allergen Reactions    Sulfa Drugs Unspecified     Pt was given to as a child; runs fever.

## 2025-07-02 NOTE — CONSULTS
"Neurology STROKE CODE H&P  Neurohospitalist Service, [facility] Neurosciences    Referring Physician: Irish Elmore M.D.    STROKE CODE: No chief complaint on file.      To obtain the most accurate data regarding the time called, and time patient seen, refer to the stroke run-sheet and chart.  For time of CT, refer to the radiology report. See A&P below for TPA Decision and door to needle time if and when applicable.    HPI:    Status post cardiac arrest.  Found to have large basal cistern IVH and ICH with extension both lateral ventricles.  Intubated GCS of 3.  Currently getting EVD.  Transferred from outside hospital.    Review of systems: In addition to what is detailed in the HPI above, (and scanned into the chart if and when applicable), all other systems reviewed and are negative.    Past Medical History:    has no past medical history on file.    FHx:  family history is not on file.    SHx:       Allergies:  Allergies[1]    Medications:  Current Medications[2]    Physical Examination:    Vitals:    07/01/25 2210 07/01/25 2214   Pulse:  64   Resp:  (!) 22   SpO2:  98%   Weight: 81.6 kg (180 lb)    Height: 1.676 m (5' 6\")          NEUROLOGICAL EXAM:     Patient's intubated and sedated.  No reactivity at all.  Some reaction to suctioning only.  While off sedation start biting on the TV.  Some spontaneous eye opening.  And vomiting.  Pupils are tiny and minimally reactive.  No gaze preference.  Positive gag.  Positive corneal reflex.  No reactivity to noxious stimuli in all 4.  Mute reflexes.        Objective Data:    Labs:  No results found for: \"PROTHROMBTM\", \"INR\"   No results found for: \"WBC\", \"RBC\", \"HEMOGLOBIN\", \"HEMATOCRIT\", \"MCV\", \"MCH\", \"MCHC\", \"MPV\", \"NEUTSPOLYS\", \"LYMPHOCYTES\", \"MONOCYTES\", \"EOSINOPHILS\", \"BASOPHILS\", \"HYPOCHROMIA\", \"ANISOCYTOSIS\"   No results found for: \"SODIUM\", \"POTASSIUM\", \"CHLORIDE\", \"CO2\", \"GLUCOSE\", \"BUN\", \"CREATININE\", \"BUNCREATRAT\", \"GLOMRATE\"   No results found " "for: \"CHOLSTRLTOT\", \"LDL\", \"HDL\", \"TRIGLYCERIDE\"    No results found for: \"ALKPHOSPHAT\", \"ASTSGOT\", \"ALTSGPT\", \"TBILIRUBIN\"     Imaging/Testing:  DX-CHEST-LIMITED (1 VIEW)   Final Result         1.  No acute cardiopulmonary disease.   2.  Atherosclerosis      CT-CTA NECK WITH & W/O-POST PROCESSING   Final Result         1.  CT angiogram of the neck within normal limits.         CT-OUTSIDE IMAGES-CT CERVICAL SPINE   Final Result      CT-OUTSIDE IMAGES-CT HEAD   Final Result      CT-CTA HEAD WITH & W/O-POST PROCESS    (Results Pending)   CT-HEAD W/O    (Results Pending)   EC-ECHOCARDIOGRAM COMPLETE W/O CONT    (Results Pending)         Assessment and Plan:    74-year-old female transferred in outside hospital status post cardiac arrest found to have a large basal cistern subarachnoid hemorrhage secondary to a ruptured right P-comm aneurysm.  Status post EVD placement.  Reyes Bellamy 5.  Modified Cody grade 4.  Neuro IR will coil the right P-comm in the morning.  In the meantime admit to ICU.  Keep the blood pressure below 140 systolic.  No antiplatelets or anticoagulation.    Plan:  1.  Keep the blood pressure below 140 systolic.  2.  Neuro IR consultation plan to coil in the morning.  3.  Neurosurgery placed EVD  .  Set at 20  4.  No antiplatelets or anticoagulant.  SCDs for DVT prophylactics only.  5.  Okay to start nimodipine 60 mg every 4 hours.  6.  Maintain net even I's and O's, normothermia and normal sodium levels.      The evaluation of the patient, and recommended management, was discussed with the resident staff.     This chart was partially generated using voice recognition technology and sound alike word replacement may be present, best efforts were made to make the chart accurate.    Benito Talavera MD  Board Certified Neurology, ABPN  (t) [phone]         [1]   Allergies  Allergen Reactions    Sulfa Drugs Unspecified     Pt was given to as a child; runs fever.    [2]   Current Facility-Administered " Medications:     niCARdipine (Cardene) 25 mg in  mL Standard Infusion, 0-15 mg/hr, Intravenous, Continuous, Irish Elmore M.D., Last Rate: 100 mL/hr at 07/01/25 2230, 10 mg/hr at 07/01/25 2230    Current Outpatient Medications:     predniSONE (DELTASONE) 20 MG Tab, 2 tablets PO QD for 3 days, Disp: 6 Tablet, Rfl: 0    ondansetron (ZOFRAN ODT) 4 MG TABLET DISPERSIBLE, Take 1 Tablet by mouth every 8 hours as needed., Disp: 20 Tablet, Rfl: 0

## 2025-07-03 PROCEDURE — 99232 SBSQ HOSP IP/OBS MODERATE 35: CPT | Mod: GC | Performed by: STUDENT IN AN ORGANIZED HEALTH CARE EDUCATION/TRAINING PROGRAM

## 2025-07-03 PROCEDURE — 700101 HCHG RX REV CODE 250

## 2025-07-03 PROCEDURE — A9270 NON-COVERED ITEM OR SERVICE: HCPCS

## 2025-07-03 PROCEDURE — 700111 HCHG RX REV CODE 636 W/ 250 OVERRIDE (IP): Performed by: NURSE PRACTITIONER

## 2025-07-03 PROCEDURE — 700102 HCHG RX REV CODE 250 W/ 637 OVERRIDE(OP)

## 2025-07-03 PROCEDURE — 770001 HCHG ROOM/CARE - MED/SURG/GYN PRIV*

## 2025-07-03 PROCEDURE — 700111 HCHG RX REV CODE 636 W/ 250 OVERRIDE (IP): Mod: JZ

## 2025-07-03 PROCEDURE — 5A1935Z RESPIRATORY VENTILATION, LESS THAN 24 CONSECUTIVE HOURS: ICD-10-PCS | Performed by: STUDENT IN AN ORGANIZED HEALTH CARE EDUCATION/TRAINING PROGRAM

## 2025-07-03 RX ORDER — MIDAZOLAM HYDROCHLORIDE 1 MG/ML
1 INJECTION INTRAMUSCULAR; INTRAVENOUS
Status: DISCONTINUED | OUTPATIENT
Start: 2025-07-03 | End: 2025-07-04 | Stop reason: HOSPADM

## 2025-07-03 RX ORDER — ONDANSETRON 2 MG/ML
8 INJECTION INTRAMUSCULAR; INTRAVENOUS EVERY 8 HOURS PRN
Status: DISCONTINUED | OUTPATIENT
Start: 2025-07-03 | End: 2025-07-04 | Stop reason: HOSPADM

## 2025-07-03 RX ORDER — BISACODYL 10 MG
10 SUPPOSITORY, RECTAL RECTAL
Status: DISCONTINUED | OUTPATIENT
Start: 2025-07-03 | End: 2025-07-04 | Stop reason: HOSPADM

## 2025-07-03 RX ORDER — ATROPINE SULFATE 10 MG/ML
2 SOLUTION/ DROPS OPHTHALMIC EVERY 4 HOURS PRN
Status: DISCONTINUED | OUTPATIENT
Start: 2025-07-03 | End: 2025-07-04 | Stop reason: HOSPADM

## 2025-07-03 RX ORDER — ACETAMINOPHEN 325 MG/1
650 TABLET ORAL EVERY 4 HOURS PRN
Status: DISCONTINUED | OUTPATIENT
Start: 2025-07-03 | End: 2025-07-04 | Stop reason: HOSPADM

## 2025-07-03 RX ORDER — DOCUSATE SODIUM 100 MG/1
100 CAPSULE, LIQUID FILLED ORAL EVERY 12 HOURS
Status: DISCONTINUED | OUTPATIENT
Start: 2025-07-03 | End: 2025-07-04 | Stop reason: HOSPADM

## 2025-07-03 RX ORDER — CARBOXYMETHYLCELLULOSE SODIUM 5 MG/ML
1 SOLUTION/ DROPS OPHTHALMIC PRN
Status: DISCONTINUED | OUTPATIENT
Start: 2025-07-03 | End: 2025-07-04 | Stop reason: HOSPADM

## 2025-07-03 RX ORDER — ACETAMINOPHEN 650 MG/1
650 SUPPOSITORY RECTAL EVERY 4 HOURS PRN
Status: DISCONTINUED | OUTPATIENT
Start: 2025-07-03 | End: 2025-07-04 | Stop reason: HOSPADM

## 2025-07-03 RX ORDER — LACTULOSE 10 G/15ML
10 SOLUTION ORAL
Status: DISCONTINUED | OUTPATIENT
Start: 2025-07-03 | End: 2025-07-04 | Stop reason: HOSPADM

## 2025-07-03 RX ORDER — ONDANSETRON 4 MG/1
8 TABLET, ORALLY DISINTEGRATING ORAL EVERY 8 HOURS PRN
Status: DISCONTINUED | OUTPATIENT
Start: 2025-07-03 | End: 2025-07-04 | Stop reason: HOSPADM

## 2025-07-03 RX ADMIN — LORAZEPAM 1 MG: 2 INJECTION INTRAMUSCULAR; INTRAVENOUS at 03:20

## 2025-07-03 RX ADMIN — MIDAZOLAM HYDROCHLORIDE 1 MG: 1 INJECTION, SOLUTION INTRAMUSCULAR; INTRAVENOUS at 14:10

## 2025-07-03 RX ADMIN — MORPHINE SULFATE 10 MG: 10 INJECTION INTRAVENOUS at 14:10

## 2025-07-03 RX ADMIN — CARBOXYMETHYLCELLULOSE SODIUM 1 DROP: 5 SOLUTION/ DROPS OPHTHALMIC at 11:46

## 2025-07-03 RX ADMIN — MORPHINE SULFATE 4 MG: 4 INJECTION INTRAVENOUS at 00:30

## 2025-07-03 RX ADMIN — ATROPINE SULFATE 2 DROP: 10 SOLUTION/ DROPS OPHTHALMIC at 08:02

## 2025-07-03 RX ADMIN — MORPHINE SULFATE 4 MG: 4 INJECTION INTRAVENOUS at 03:21

## 2025-07-03 RX ADMIN — MORPHINE SULFATE 10 MG: 10 INJECTION INTRAVENOUS at 18:08

## 2025-07-03 RX ADMIN — ATROPINE SULFATE 2 DROP: 10 SOLUTION/ DROPS OPHTHALMIC at 22:03

## 2025-07-03 RX ADMIN — MORPHINE SULFATE 4 MG: 4 INJECTION INTRAVENOUS at 05:46

## 2025-07-03 RX ADMIN — MORPHINE SULFATE 10 MG: 10 INJECTION INTRAVENOUS at 11:45

## 2025-07-03 RX ADMIN — LORAZEPAM 1 MG: 2 INJECTION INTRAMUSCULAR; INTRAVENOUS at 00:30

## 2025-07-03 RX ADMIN — LORAZEPAM 1 MG: 2 INJECTION INTRAMUSCULAR; INTRAVENOUS at 05:46

## 2025-07-03 RX ADMIN — MIDAZOLAM HYDROCHLORIDE 1 MG: 1 INJECTION, SOLUTION INTRAMUSCULAR; INTRAVENOUS at 18:09

## 2025-07-03 RX ADMIN — MIDAZOLAM HYDROCHLORIDE 1 MG: 1 INJECTION, SOLUTION INTRAMUSCULAR; INTRAVENOUS at 11:44

## 2025-07-03 RX ADMIN — MIDAZOLAM HYDROCHLORIDE 1 MG: 1 INJECTION, SOLUTION INTRAMUSCULAR; INTRAVENOUS at 08:02

## 2025-07-03 RX ADMIN — MORPHINE SULFATE 10 MG: 10 INJECTION INTRAVENOUS at 22:02

## 2025-07-03 RX ADMIN — MORPHINE SULFATE 5 MG: 10 INJECTION INTRAVENOUS at 08:02

## 2025-07-03 ASSESSMENT — PAIN DESCRIPTION - PAIN TYPE
TYPE: ACUTE PAIN

## 2025-07-03 ASSESSMENT — COGNITIVE AND FUNCTIONAL STATUS - GENERAL
WALKING IN HOSPITAL ROOM: TOTAL
SUGGESTED CMS G CODE MODIFIER DAILY ACTIVITY: CN
SUGGESTED CMS G CODE MODIFIER MOBILITY: CN
MOVING FROM LYING ON BACK TO SITTING ON SIDE OF FLAT BED: TOTAL
MOVING TO AND FROM BED TO CHAIR: TOTAL
STANDING UP FROM CHAIR USING ARMS: TOTAL
EATING MEALS: TOTAL
PERSONAL GROOMING: TOTAL
HELP NEEDED FOR BATHING: TOTAL
CLIMB 3 TO 5 STEPS WITH RAILING: TOTAL
TOILETING: TOTAL
MOBILITY SCORE: 6
DAILY ACTIVITIY SCORE: 6
DRESSING REGULAR UPPER BODY CLOTHING: TOTAL
DRESSING REGULAR LOWER BODY CLOTHING: TOTAL
TURNING FROM BACK TO SIDE WHILE IN FLAT BAD: TOTAL

## 2025-07-03 ASSESSMENT — PAIN SCALES - PAIN ASSESSMENT IN ADVANCED DEMENTIA (PAINAD)
BODYLANGUAGE: RELAXED
TOTALSCORE: 2
CONSOLABILITY: NO NEED TO CONSOLE
FACIALEXPRESSION: SMILING OR INEXPRESSIVE
BREATHING: NOISY LABORED BREATHING, LONG PERIODS OF HYPERVENTILATION, CHEYNE-STOKES RESPIRATIONS

## 2025-07-03 NOTE — CARE PLAN
The patient is Unstable - High likelihood or risk of patient condition declining or worsening    Shift Goals  Clinical Goals: Comfort Care  Patient Goals: KELVIN  Family Goals: Comfort for their mom, Support    Comfort care measures in place.   Pain management with PRN Ativan and Morphine Q2 Hours.  Family at bedside, no requests at this time.     Progress made toward(s) clinical / shift goals:    Problem: Knowledge Deficit - Comfort Care  Goal: Patient and family/care givers will demonstrate understanding of dying process and grieving  Provide support and education regarding the dying process and grieving.  Outcome: Progressing     Problem: Psychosocial - Comfort Care  Goal: Patient's level of anxiety will decrease  1.  Collaborate with patient and family/caregiver to identify triggers and develop strategies to cope with anxiety  2.  Implement stimuli reduction, calming techniques  3.  Pharmacologic management per provider order  4.  Encourage patient/family/care giver participation  5.  Collaborate with interdisciplinary team including Psychologist or Behavioral Health Team as needed  Outcome: Progressing  Goal: Patient and family will demonstrate ability to cope with life altering diagnosis and/or procedure  1. Expect initial shock and disbelief following diagnosis of cancer and traumatizing procedures (disfiguring surgery, colostomy, amputation).  2.  Assess patient and family/caregiver for stage of grief currently being experienced. Explain process as appropriate.  3.  Provide open, nonjudgmental environment. Use therapeutic communication skills of Active-Listening, acknowledgment, and so on.  4.  Encourage verbalization of thoughts or concerns and accept expressions of sadness, anger, rejection. Acknowledge normality of these feelings  5.  Be aware of mood swings, hostility, and other acting-out behavior. Set limits on inappropriate behavior, redirect negative thinking  6.  Be aware of debilitating depression.  Ask patient direct questions about state of mind.  7.  Visit frequently and provide physical contact as appropriate, or provide frequent phone support as appropriate for setting. Arrange for care provider and support person to stay with patient as needed  8.  Reinforce teaching regarding disease process and treatments and provide information as appropriate about dying. Be honest; do not give false hope while providing emotional support  9.  Review past life experiences, role changes, and coping skills. Talk about things that interest the patient  Outcome: Progressing     Patient is not progressing towards the following goals:

## 2025-07-03 NOTE — DISCHARGE PLANNING
Migdalia has been transitioned to comfort care. TCC will no longer follow.  Please reach out to myself with any questions.

## 2025-07-03 NOTE — CARE PLAN
The patient is Unstable - High likelihood or risk of patient condition declining or worsening    Shift Goals  Clinical Goals: Comfort/ Pain management.  Patient Goals: comfort  Family Goals: Updated.    Progress made toward(s) clinical / shift goals:    Problem: Knowledge Deficit - Comfort Care  Goal: Patient and family/care givers will demonstrate understanding of dying process and grieving  Description: Target End Date:  1-3 days or as soon as patient condition allows    Provide support and education regarding the dying process and grieving.  Outcome: Progressing  Note: POC discussed with family at bedside     Problem: Psychosocial - Comfort Care  Goal: Spiritual and cultural needs incorporated into hospitalization  Description: Target End Date:  End of day 1    1.  Encourage verbalization of feelings, concerns, expectations and needs  2.  Collaborate with Case Management/  3.  Collaborate with Pastoral Care to meet spiritual needs  Outcome: Progressing  Flowsheets (Taken 7/3/2025 1541)  Incorporate Spiritual/Cultural Needs: Encouraged support system participation  Goal: Patient's level of anxiety will decrease  Description: Target End Date:  1-3 days or as soon as patient condition allows    1.  Collaborate with patient and family/caregiver to identify triggers and develop strategies to cope with anxiety  2.  Implement stimuli reduction, calming techniques  3.  Pharmacologic management per provider order  4.  Encourage patient/family/care giver participation  5.  Collaborate with interdisciplinary team including Psychologist or Behavioral Health Team as needed  Outcome: Progressing  Flowsheets (Taken 7/3/2025 1541)  Decrease Anxiety Level:   Implemented stimuli reduction, calming techniques   Encouraged support system participation   Pharmacologic management per MD order  Note: Anxiety assessed and medications administered as ordered    Goal: Patient and family will demonstrate ability to cope with  life altering diagnosis and/or procedure  Description: Target End Date:  1-3 days or as soon as patient condition allows    1. Expect initial shock and disbelief following diagnosis of cancer and traumatizing procedures (disfiguring surgery, colostomy, amputation).  2.  Assess patient and family/caregiver for stage of grief currently being experienced. Explain process as appropriate.  3.  Provide open, nonjudgmental environment. Use therapeutic communication skills of Active-Listening, acknowledgment, and so on.  4.  Encourage verbalization of thoughts or concerns and accept expressions of sadness, anger, rejection. Acknowledge normality of these feelings  5.  Be aware of mood swings, hostility, and other acting-out behavior. Set limits on inappropriate behavior, redirect negative thinking  6.  Be aware of debilitating depression. Ask patient direct questions about state of mind.  7.  Visit frequently and provide physical contact as appropriate, or provide frequent phone support as appropriate for setting. Arrange for care provider and support person to stay with patient as needed  8.  Reinforce teaching regarding disease process and treatments and provide information as appropriate about dying. Be honest; do not give false hope while providing emotional support  9.  Review past life experiences, role changes, and coping skills. Talk about things that interest the patient  Outcome: Progressing  Note: Emotional support offered and provided to family  Goal: Privacy will be maintained for patient and family  Description: Target End Date:  End of day 1    Obtain private room  Outcome: Progressing  Note: Door sign posted       Patient is not progressing towards the following goals:

## 2025-07-03 NOTE — PROGRESS NOTES
Brief IR Note:     74-year-old female s/p flow diverter placement for ruptured right ICA aneurysm with SHAWN Bourne on 07/02/2025.  Patient transitioned to comfort care yesterday afternoon.    SHAWN will sign off at this time.

## 2025-07-03 NOTE — PROGRESS NOTES
Had discussion with pt son at bedside regarding removal of the central line. Son refused removal of central line. Education provided.

## 2025-07-03 NOTE — PROGRESS NOTES
Neurosurgery Progress Note    Subjective:  74-year-old woman of unknown medical history   who collapsed today at her home. Experienced cardiac arrest with resuscitation and transport to [facility]. CAT scan from the outside hospital showed subarachnoid hemorrhage diffusely and primarily in the prepontine area as well as in the prepontine area and casting of the third and fourth ventricles with lateral ventricular hydrocephalus bilaterally.    POD#2 EVD placement  POD#1 Flow diverter placement by IR  - findings of Right ICA aneurysm     Family has decided on comfort care    Exam:  Pt laying comfortably in bed  Asleep  Exam deferred       BP  Min: 113/58  Max: 128/58  Pulse  Av.5  Min: 60  Max: 71  Resp  Av.8  Min: 19  Max: 23  Monitored Temp 2  Av.6 °C (96.1 °F)  Min: 35.2 °C (95.4 °F)  Max: 36.3 °C (97.3 °F)  SpO2  Av.8 %  Min: 98 %  Max: 100 %    ICP  Avg: 3.5 MM HG  Min: 1 MM HG  Max: 6 MM HG    Recent Labs     25  0120 25  0550   WBC 23.7* 26.0* 20.8*   RBC 4.37 4.23 4.22   HEMOGLOBIN 12.1 11.8* 11.7*   HEMATOCRIT 36.6* 36.2* 35.4*   MCV 83.8 85.6 83.9   MCH 27.7 27.9 27.7   MCHC 33.1 32.6 33.1   RDW 47.5 48.6 47.8   PLATELETCT 330 319 317   MPV 10.2 9.8 10.0     Recent Labs     25  0120 25  0550 25  0725   SODIUM 138 139 139 141   POTASSIUM 3.3* 2.8* 3.2* 3.2*   CHLORIDE 105 106 108  --    CO2 18* 18* 18*  --    GLUCOSE 189* 252* 188*  --    BUN 32* 30* 26*  --    CREATININE 0.89 0.79 0.76  --    CALCIUM 8.4* 8.2* 8.1*  --      Recent Labs     25  0300   APTT 23.7*   INR 1.23*     Recent Labs     07/01/25  2219   REACTMIN 3.8*   CLOTKINET 0.8   CLOTANGL 79.4*   MAXCLOTS 68.6   NEW49ZRZ 0.0   PRCINADP 20.0*   PRCINAA 42.3*       Intake/Output                         25 - 25 - 25 Total  Total                 Intake     P.O.  --  0 0  0  -- 0    P.O. -- 0 0 0 -- 0    I.V.  1902.2  -- 1902.2  --  -- --    Magnesium Sulfate Volume 60 -- 60 -- -- --    Cardene Volume 665.7 -- 665.7 -- -- --    Volume (mL) (NS infusion) 676.6 -- 676.6 -- -- --    Volume (mL) (electrolyte-A (Plasmalyte-A) infusion) 500 -- 500 -- -- --    IV Piggyback  523.2  -- 523.2  --  -- --    Volume (mL) (ceFAZolin (Ancef) 2 g in  mL IVPB) 180 -- 180 -- -- --    Volume (mL) (potassium phosphate 15 mmol in  mL ivpb) 249.6 -- 249.6 -- -- --    Volume (mL) (eptifibatide 0.75 mg/mL (Integrilin) infusion) 93.6 -- 93.6 -- -- --    Total Intake 2425.4 0 2425.4 0 -- 0       Output    Urine  1400  250 1650  0  -- 0    Urine Void (mL) -- 0 0 0 -- 0    Output (mL) (Urethral Catheter Temperature probe) 7628 561 1808 -- -- --    Drains  109  -- 109  --  -- --    Output (mL) (ICP/Ventriculostomy Right Parietal region) 109 -- 109 -- -- --    Stool  --  -- --  --  -- --    Number of Times Stooled -- 0 x 0 x -- -- --    Total Output 5996 576 9983 0 -- 0       Net I/O     916.4 -250 666.4 0 -- 0              Intake/Output Summary (Last 24 hours) at 7/3/2025 1159  Last data filed at 7/3/2025 0802  Gross per 24 hour   Intake 1602.19 ml   Output 1727 ml   Net -124.81 ml             atropine  2 Drop Q4HRS PRN    acetaminophen  650 mg Q4HRS PRN    Or    acetaminophen  650 mg Q4HRS PRN    morphine injection  5 mg Q HOUR PRN    morphine injection  10 mg Q HOUR PRN    ondansetron  8 mg Q8HRS PRN    Or    ondansetron  8 mg Q8HRS PRN    midazolam  1 mg Q HOUR PRN    lactulose  10 g QDAY PRN    Or    bisacodyl  10 mg QDAY PRN    docusate sodium  100 mg Q12HRS    carboxymethylcellulose  1 Drop PRN    artificial tears  1 Application Q6HR    MD ALERT...adult comfort care   PRN       Assessment and Plan:  Hospital day # 3  POD# 1/2  Discussed with Dr. Petersen  Family deciding on comfort care  Removed EVD drain today and covered with Gauze/Tegaderm  Double staple placed over EVD site  NS  signing off.       Chemical prophylactic DVT therapy: No  Start date/time: TBD     Brain Compression: Yes Nontraumatic               [facility]

## 2025-07-03 NOTE — PROGRESS NOTES
[facility] Internal Medicine Daily Progress Note    Date of Service  7/3/2025    [facility] Team: [facility]   Attending: Wendy Vieyra M.d.  Senior Resident: Dr. Muller   Intern:  Dr. Carbajal  Contact Number: [phone]    Chief Complaint  Migdalia Aguilar is a 74 y.o. female of unknown medical hx was admitted 7/1/2025 for subarachnoid hemorrhage (SAH) with intraventrucular hemorrhage (IVH).    Hospital Course  Our 74 year old female with unknown medical hx experienced a witnessed PEA arrest needing 10 minutes of resuscitation. She was initially transported to [facility] where she was diagnosed with a SAH with IVH due to ruptured right P-comm aneurysm. She was transferred to [facility] where a right frontal external ventricular drain and flow diverter was placed and she was admitted to the ICU. CTA Head wwo showed 2 right-sided and 1 left-sided aneurysms involving the terminal segment of the internal carotid arteries with subarachnoid hemorrhages, greatest at the basal cisterns which appears somewhat increased since prior study, and bilateral hydrocephalus, increased slightly since prior study, status post trach placement of ventriculostomy tube. After extensive discussion, her family has decided to transition to comfort care on 7/2/25 as it was consistent with patient's previous wishes.    Interval Problem Update  Spoke with son, Smith, and daughter, Princess, at bedside. Patient comfortably lying in bed. Addressed any concerns and expressed continued comfort measures for her. They are still deciding on home vs in-hospital hospice.     I have discussed this patient's plan of care and discharge plan at IDT rounds today with Case Management, Nursing, Nursing leadership, and other members of the IDT team.    Consultants/Specialty  None    Code Status  Comfort Care/DNR    Disposition  The patient is not medically cleared for discharge to home or a post-acute facility.  Anticipate discharge to: hospice    I have placed the  appropriate orders for post-discharge needs.    Review of Systems  Review of Systems   Unable to perform ROS: Other (Comfort care measures only)        Physical Exam  Pulse:  [66] 66  Resp:  [22] 22  BP: (126)/(58) 126/58  SpO2:  [98 %] 98 %    Physical Exam  Vitals reviewed: No vital signs, comfort care measures only. No physical exam performed.         Fluids    Intake/Output Summary (Last 24 hours) at 7/3/2025 1652  Last data filed at 7/3/2025 1200  Gross per 24 hour   Intake 229.13 ml   Output 2407 ml   Net -2177.87 ml       Laboratory  Recent Labs     07/01/25 2219 07/02/25  0120 07/02/25  0550   WBC 23.7* 26.0* 20.8*   RBC 4.37 4.23 4.22   HEMOGLOBIN 12.1 11.8* 11.7*   HEMATOCRIT 36.6* 36.2* 35.4*   MCV 83.8 85.6 83.9   MCH 27.7 27.9 27.7   MCHC 33.1 32.6 33.1   RDW 47.5 48.6 47.8   PLATELETCT 330 319 317   MPV 10.2 9.8 10.0     Recent Labs     07/01/25 2219 07/02/25  0120 07/02/25  0550 07/02/25  0725   SODIUM 138 139 139 141   POTASSIUM 3.3* 2.8* 3.2* 3.2*   CHLORIDE 105 106 108  --    CO2 18* 18* 18*  --    GLUCOSE 189* 252* 188*  --    BUN 32* 30* 26*  --    CREATININE 0.89 0.79 0.76  --    CALCIUM 8.4* 8.2* 8.1*  --      Recent Labs     07/02/25  0300   APTT 23.7*   INR 1.23*         Recent Labs     07/01/25 2219   TRIGLYCERIDE 189*   HDL 53   LDL 84       Imaging  DX-OUTSIDE IMAGES-DX CHEST   Final Result      IR-EMBOLIZE-NEURO-INTRACRANIAL   Final Result      1.  Ruptured right neck right supraclinoid internal carotid aneurysm.   2.  Successful endovascular repair using the flow diverter.   3.  Unruptured left supraclinoid internal carotid aneurysm. This will be addressed in future date.   4.  Unruptured right cavernous segment aneurysm. This will be addressed in the future date.      EC-ECHOCARDIOGRAM COMPLETE W/O CONT   Final Result      DX-CHEST-FOR LINE PLACEMENT Perform procedure in: Patient's Room   Final Result         1.  No acute cardiopulmonary disease.   2.  Atherosclerosis       DX-CHEST-LIMITED (1 VIEW)   Final Result         1.  No acute cardiopulmonary disease.   2.  Atherosclerosis      CT-CTA NECK WITH & W/O-POST PROCESSING   Final Result         1.  CT angiogram of the neck within normal limits.         CT-CTA HEAD WITH & W/O-POST PROCESS   Final Result         1.  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   2.  Subarachnoid hemorrhages, greatest at the basal cisterns, appears somewhat increased since prior study.   3.  Bilateral hydrocephalus, increased slightly since prior study, status post trach placement of ventriculostomy tube.   4.  Bilateral intraventricular hemorrhages, new since prior study.   5.  Atherosclerosis      These findings were discussed with the patient's clinician, Irish Elmore, on 7/2/2025 12:04 AM.      CT-OUTSIDE IMAGES-CT CERVICAL SPINE   Final Result      CT-OUTSIDE IMAGES-CT HEAD   Final Result           Assessment/Plan  Problem Representation:    * Subarachnoid hemorrhage (HCC)  Assessment & Plan  Patient on comfort care measures    Advanced care planning/counseling discussion  Assessment & Plan  Comfort care measures. Family will discuss with each other.    Leukocytosis  Assessment & Plan  Patient on comfort care measures    Acute respiratory failure with hypoxia (HCC)  Assessment & Plan  Patient on comfort care measures    Elevated LFTs  Assessment & Plan  Patient on comfort care measures    Cardiac arrest (HCC)- (present on admission)  Assessment & Plan  Patient on comfort care measures         VTE prophylaxis: None, comfort measures only    I have performed a physical exam and reviewed and updated ROS and Plan today (7/3/2025). In review of yesterday's note (7/2/2025), there are no changes except as documented above.

## 2025-07-03 NOTE — PROGRESS NOTES
Neurosurg at bedside for drain removal. Gauze/tegaderm dressing applied after removal by nursing.

## 2025-07-04 PROCEDURE — 99239 HOSP IP/OBS DSCHRG MGMT >30: CPT | Mod: GC | Performed by: STUDENT IN AN ORGANIZED HEALTH CARE EDUCATION/TRAINING PROGRAM

## 2025-07-04 NOTE — DISCHARGE SUMMARY
[facility] Death Summary      Attending: Wendy Vieyra M.D.  Senior Resident: Dr. Daniel Muller  Intern:  Dr. Alva Carbajal  Call Back Contact Number: [phone]    Admission Date  7/1/2025    Cause of Death  Subarachnoid hemorrhage due to Ruptured right ICA aneurysm     Comorbid Conditions at the Time of Death  Principal Problem:    Subarachnoid hemorrhage (HCC) (POA: Unknown)  Active Problems:    Cardiac arrest (HCC) (POA: Yes)    Elevated LFTs (POA: Unknown)    Acute respiratory failure with hypoxia (HCC) (POA: Unknown)    Leukocytosis (POA: Unknown)    Advanced care planning/counseling discussion (POA: Unknown)  Resolved Problems:    * No resolved hospital problems. *      History of Presenting Illness and Hospital Course  This is a 74 y.o. female admitted 7/1/2025 with unknown medical history experienced a witnessed PEA arrest needing 10 minutes of resuscitation. She was initially transported to [facility] where she was diagnosed with a SAH with IVH due to ruptured right P-comm aneurysm. She was transferred to [facility] where a right frontal external ventricular drain and flow diverter was placed and she was admitted to the ICU. CTA Head wwo showed 2 right-sided and 1 left-sided aneurysms involving the terminal segment of the internal carotid arteries with subarachnoid hemorrhages, greatest at the basal cisterns which appears somewhat increased since prior study, and bilateral hydrocephalus, increased slightly since prior study, status post trach placement of ventriculostomy tube. After extensive discussion, her family has decided to transition to comfort care on 7/2/25 as it was consistent with patient's previous wishes.  Patient passed away peacefully  on 7/4/25 1:45 AM.     Consultants  critical care, neurology, and Interventional radiology    Procedures  7/2/2025 EVD placed by neurosurgery; flow diverter placed by IR    Death Date: 07/04/25   Death Time: 0145         Pronounced By (RN1): Abdullahi  Downen  Pronounced By (RN2): Marge Jenkins

## 2025-07-04 NOTE — PROGRESS NOTES
Patient pronounced dead by 2 Rns at 0145 7/4/25. Family at bedside. Family took belongings with them. MD notified, donor network and  called within the hour. Not a candidate for donation or coroners case. Patient placed in a body bag awaiting transport to the [facility]. Transported to the [facility] at 0440.